# Patient Record
Sex: FEMALE | Race: WHITE | Employment: FULL TIME | URBAN - METROPOLITAN AREA
[De-identification: names, ages, dates, MRNs, and addresses within clinical notes are randomized per-mention and may not be internally consistent; named-entity substitution may affect disease eponyms.]

---

## 2020-09-14 ENCOUNTER — HOSPITAL ENCOUNTER (OUTPATIENT)
Dept: PHYSICAL THERAPY | Age: 50
Discharge: HOME | End: 2020-09-14
Payer: OTHER GOVERNMENT

## 2020-09-14 PROCEDURE — 97110 THERAPEUTIC EXERCISES: CPT | Performed by: PHYSICAL THERAPIST

## 2020-09-14 PROCEDURE — 97014 ELECTRIC STIMULATION THERAPY: CPT | Performed by: PHYSICAL THERAPIST

## 2020-09-14 PROCEDURE — 97162 PT EVAL MOD COMPLEX 30 MIN: CPT | Performed by: PHYSICAL THERAPIST

## 2020-09-14 NOTE — PROGRESS NOTES
PHYSICAL THERAPY - DAILY TREATMENT NOTE    Patient Name: Chelita Saucedo        Date: 2020  : 1970   YES Patient  Verified  Visit #:     Insurance: Payor: RICHARD / Plan: Ravinder Aggarwal 74 / Product Type:  /      In time: 12:00 Out time: 1:10   Total Treatment Time: 65     BCBS/Medicare Time Tracking (below)   Total Timed Codes (min):  na 1:1 Treatment Time:  na     TREATMENT AREA =  Low back pain [M54.5]    SUBJECTIVE  Pain Level (on 0 to 10 scale):  8-9  / 10   Medication Changes/New allergies or changes in medical history, any new surgeries or procedures?     NO    If yes, update Summary List   Subjective Functional Status/Changes:  [x]  No changes reported     See eval/POC           Modalities Rationale:     decrease pain and increase tissue extensibility to improve patient's ability to increase positional/movement tolerance  15 min [x] Estim, type/location: IFC to low back - prone after session                                     []  att     []  unatt     []  w/US     []  w/ice    [x]  w/heat    min []  Mechanical Traction: type/lbs                   []  pro   []  sup   []  int   []  cont    []  before manual    []  after manual    min []  Ultrasound, settings/location:      min []  Iontophoresis w/ dexamethasone, location:                                               []  take home patch       []  in clinic    min []  Ice     []  Heat    location/position:     min []  Vasopneumatic Device, press/temp:     min []  Other:    [x] Skin assessment post-treatment (if applicable):    [x]  intact    []  redness- no adverse reaction     []redness  adverse reaction:        15 min Therapeutic Exercise:  [x]  See flow sheet   Rationale:      increase ROM and improve coordination to improve the patients ability to maintain neutral spine posture       Billed With/As:   [] TE   [] TA   [] Neuro   [] Self Care Patient Education: [x] Review HEP    [] Progressed/Changed HEP based on:   [] positioning   [] body mechanics   [] transfers   [] heat/ice application    [] other:      Other Objective/Functional Measures:    FOTO - 32     Post Treatment Pain Level (on 0 to 10) scale:   7  / 10     ASSESSMENT  Assessment/Changes in Function:     Pt has signs and symptoms suggestive of acute L lumbar radiculopathy     []  See Progress Note/Recertification   Patient will continue to benefit from skilled PT services to modify and progress therapeutic interventions, address functional mobility deficits, address ROM deficits, address strength deficits, analyze and address soft tissue restrictions, analyze and cue movement patterns, analyze and modify body mechanics/ergonomics, and assess and modify postural abnormalities to attain remaining goals.    Progress toward goals / Updated goals:    Goals established today     PLAN  [x]  Upgrade activities as tolerated YES Continue plan of care   []  Discharge due to :    []  Other:      Therapist: Patricia Martinez PT    Date: 9/14/2020 Time: 8:42 AM     Future Appointments   Date Time Provider Rose De Leon   9/14/2020 12:00 PM Analia Batista, PT [facility]

## 2020-09-14 NOTE — PROGRESS NOTES
[facility] PHYSICAL THERAPY AT [address] [place] [address]  [address]oni [address]omes [address], [address] ,[address], [address]  Phone: [phone]  Fax: [phone] / [address] [address] PHYSICAL THERAPY SERVICES  Patient Name: Mirtha Patel : 1970   Medical   Diagnosis: Low back pain [M54.5] Treatment Diagnosis: LBP   Onset Date: 2020     Referral Source: Damien Cornejo MD [place] [facility]): 2020   Prior Hospitalization: See medical history Provider #: [id number]   Prior Level of Function: Unlimited walking/positional tolerance   Comorbidities: H/o L5/S1 HNP, recurrent LBP   Medications: Verified on Patient Summary List   The Plan of Care and following information is based on the information from the initial evaluation.   ===========================================================================================  Assessment / key information:  46 y/o female presents to PT with c/o L sided lower back and L posterior thigh and leg pain. She reports having a several year h/o of recurrent LBP, but has bene doing well for several years. She did a lot of yardwork this summer, and developed more LBP and it started radiating down her leg in August.  Pt describes inability to put pressure on her L LE, but after a course of steroids, she has improved slightly. Currently her pain is 8-9/10. Examination reveals pt sitting with a slight lean to the right. She has increased pain with transitional movements. Able to have pt prone, but it took awhile to get up onto elbows. She did get some centralization of symptoms form foot to just back and buttock area, as well improved spinal extension ROM. She was educated on sitting with lumbar roll to maintain neutral spine.   Pt has been relying on medications to help manage symptoms and allow her perform ADLs, and we tried to steer her focus to neutral spine posture or more extension to prevent exacerbation/peropherization of symptoms. Ms Mikki Ham has signs and symptoms suggestive of L lumbar radiculopathy with known HNP.  ===========================================================================================  Eval Complexity: History MEDIUM  Complexity : 1-2 comorbidities / personal factors will impact the outcome/ POC ;  Examination  MEDIUM Complexity : 3 Standardized tests and measures addressing body structure, function, activity limitation and / or participation in recreation ; Presentation MEDIUM Complexity : Evolving with changing characteristics ; Decision Making MEDIUM Complexity : FOTO score of 26-74; Overall Complexity MEDIUM  Problem List: pain affecting function, decrease ROM, decrease strength, impaired gait/ balance, decrease ADL/ functional abilitiies, decrease activity tolerance, decrease flexibility/ joint mobility and decrease transfer abilities   Treatment Plan may include any combination of the following: Therapeutic exercise, Therapeutic activities, Neuromuscular re-education, Physical agent/modality, Gait/balance training, Manual therapy, Dry Needling, Aquatic therapy, Patient education, Self Care training, Functional mobility training, Home safety training and Stair training  Patient / Family readiness to learn indicated by: asking questions, trying to perform skills and interest  Persons(s) to be included in education: patient (P)  Barriers to Learning/Limitations: None  Measures taken:    Patient Goal (s): \"alleviate lower back pain\"   Patient self reported health status: good  Rehabilitation Potential: good   Short Term Goals: To be accomplished in  2  weeks:  1. Pt independent with basic HEP for extension biased ROM and positioning. 2. Pt to manage symptoms to </= 4/10 with HEP, proper sitting posture/body mechanics, and use of lumbar corset.  Long Term Goals: To be accomplished in  6  weeks:  1.  Pt to increase FOTO score from 32 to >/= 51.  2. Pt independent with high level HEP for hip flexibility, spinal stabilization and body mechanics. 3. Pt able to perform dressing, shopping, and home housework without pain > 1/10.  4. Pt to go one week withoutt any L LE pain. Frequency / Duration:   Patient to be seen  2-3  times per week for 6  weeks:  Patient / Caregiver education and instruction: self care, activity modification, brace/ splint application and exercises    Therapist Signature: Jeevanlorraine Ron PT Date: 6/35/9641   Certification Period: NA Time: 8:41 AM   ===========================================================================================  I certify that the above Physical Therapy Services are being furnished while the patient is under my care. I agree with the treatment plan and certify that this therapy is necessary. Physician Signature:        Date:       Time:     Please sign and return to [facility] or you may fax the signed copy to [phone]. Thank you.

## 2020-09-16 ENCOUNTER — HOSPITAL ENCOUNTER (OUTPATIENT)
Dept: PHYSICAL THERAPY | Age: 50
Discharge: HOME | End: 2020-09-16
Payer: OTHER GOVERNMENT

## 2020-09-16 PROCEDURE — 97014 ELECTRIC STIMULATION THERAPY: CPT | Performed by: PHYSICAL THERAPIST

## 2020-09-16 PROCEDURE — 97110 THERAPEUTIC EXERCISES: CPT | Performed by: PHYSICAL THERAPIST

## 2020-09-16 NOTE — PROGRESS NOTES
PHYSICAL THERAPY - DAILY TREATMENT NOTE    Patient Name: Cherylene Band        Date: 2020  : 1970   YES Patient  Verified  Visit #:      12  Insurance: Payor: RICHARD / Plan: Ravinder Aggarwal 74 / Product Type:  /      In time: 12:00 Out time: 12:50   Total Treatment Time: 45     BCBS/Medicare Time Tracking (below)   Total Timed Codes (min):  na 1:1 Treatment Time:  na     TREATMENT AREA =  Low back pain [M54.5]    SUBJECTIVE  Pain Level (on 0 to 10 scale):  8-9  / 10   Medication Changes/New allergies or changes in medical history, any new surgeries or procedures? NO    If yes, update Summary List   Subjective Functional Status/Changes:  [x]  No changes reported     Pt reports trying to perform spinal extension exercises, but had to stop due to increase in back and L LE pain.           Modalities Rationale:     decrease pain and increase tissue extensibility to improve patient's ability to increase positional/movement tolerance  15 min [x] Estim, type/location: IFC to low back - L sidelying after session                                     []  att     []  unatt     []  w/US     []  w/ice    [x]  w/heat    min []  Mechanical Traction: type/lbs                   []  pro   []  sup   []  int   []  cont    []  before manual    []  after manual    min []  Ultrasound, settings/location:      min []  Iontophoresis w/ dexamethasone, location:                                               []  take home patch       []  in clinic    min []  Ice     []  Heat    location/position:     min []  Vasopneumatic Device, press/temp:     min []  Other:    [x] Skin assessment post-treatment (if applicable):    [x]  intact    []  redness- no adverse reaction     []redness  adverse reaction:        30 min Therapeutic Exercise:  [x]  See flow sheet   Rationale:      increase ROM and improve coordination to improve the patients ability to maintain neutral spine posture       Billed With/As:   [] TE   [] TA   [] Neuro   [] Self Care Patient Education: [x] Review HEP    [] Progressed/Changed HEP based on:   [] positioning   [] body mechanics   [] transfers   [] heat/ice application    [] other:      Other Objective/Functional Measures:    Pt loida to reduce leg pain with hooklying. Performed SKC, alternating to resolve all LE symptoms. In L sidelying, using positional distraction, which abolished L buttock and leg pain. Post Treatment Pain Level (on 0 to 10) scale:   5  / 10     ASSESSMENT  Assessment/Changes in Function:     Improved management of L LE symptoms with flexion/sidebend R movements/positions. []  See Progress Note/Recertification   Patient will continue to benefit from skilled PT services to modify and progress therapeutic interventions, address functional mobility deficits, address ROM deficits, address strength deficits, analyze and address soft tissue restrictions, analyze and cue movement patterns, analyze and modify body mechanics/ergonomics, and assess and modify postural abnormalities to attain remaining goals. Progress toward goals / Updated goals:    Making gradual progress with symptom management.      PLAN  [x]  Upgrade activities as tolerated YES Continue plan of care   []  Discharge due to :    []  Other:      Therapist: Roberth Boyce PT    Date: 9/16/2020 Time: 8:42 AM     Future Appointments   Date Time Provider Rose De Leon   9/16/2020 12:00 PM Renan Gates PT [facility]   9/18/2020  9:00 AM Joellen Kelsey, LUCIEN [facility]   9/21/2020 12:00 PM Pilo Solis [facility]   9/23/2020 12:45 PM Renan Gates PT [facility]   9/25/2020 12:15 PM Renan Gates PT [facility]   9/28/2020  3:30 PM Margot Campbell, PT [facility]

## 2020-09-18 ENCOUNTER — HOSPITAL ENCOUNTER (OUTPATIENT)
Dept: PHYSICAL THERAPY | Age: 50
Discharge: HOME | End: 2020-09-18
Payer: OTHER GOVERNMENT

## 2020-09-18 PROCEDURE — 97110 THERAPEUTIC EXERCISES: CPT

## 2020-09-18 PROCEDURE — 97014 ELECTRIC STIMULATION THERAPY: CPT

## 2020-09-18 NOTE — PROGRESS NOTES
PHYSICAL THERAPY - DAILY TREATMENT NOTE    Patient Name: Gisella Calhoun        Date: 2020  : 1970   YES Patient  Verified  Visit #:   3   of   12  Insurance: Payor: RICHARD / Plan: Ravinder Aggarwal 74 / Product Type:  /      In time: 9 A Out time: 9:55 A   Total Treatment Time: 50     BCBS/Medicare Time Tracking (below)   Total Timed Codes (min):  50 1:1 Treatment Time:  50     TREATMENT AREA = Low back pain [M54.5]    SUBJECTIVE  Pain Level (on 0 to 10 scale):  -9  / 10   Medication Changes/New allergies or changes in medical history, any new surgeries or procedures? NO    If yes, update Summary List   Subjective Functional Status/Changes:  []  No changes reported     Patient reports her pain is constant in back & L leg, 7/10 at the best, she is still waking up every 2-3 hours.            Modalities Rationale:     decrease pain to improve patient's ability to return to pain-free standing  15 min [x] Estim, type/location: IFC to l/s in R S/L with 2 pillows                                     []  att     [x]  unatt     []  w/US     []  w/ice    [x]  w/heat    min []  Mechanical Traction: type/lbs                   []  pro   []  sup   []  int   []  cont    []  before manual    []  after manual    min []  Ultrasound, settings/location:      min []  Iontophoresis w/ dexamethasone, location:                                               []  take home patch       []  in clinic    min []  Ice     []  Heat    location/position:     min []  Vasopneumatic Device, press/temp:     min []  Other:    [] Skin assessment post-treatment (if applicable):    [x]  intact    [x]  redness- no adverse reaction     []redness  adverse reaction:        35 min Therapeutic Exercise:  [x]  See flow sheet   Rationale:      increase ROM and increase strength to improve the patients ability to return to pain-free ADLs     Billed With/As:   [] TE   [] TA   [] Neuro   [] Self Care Patient Education: [x] Review HEP [] Progressed/Changed HEP based on:   [] positioning   [] body mechanics   [] transfers   [] heat/ice application    [] other:      Other Objective/Functional Measures: Added double knee to chest & H/L abd draw      Post Treatment Pain Level (on 0 to 10) scale:   7  / 10     ASSESSMENT  Assessment/Changes in Function:     Slow progress with symptom reduction      []  See Progress Note/Recertification   Patient will continue to benefit from skilled PT services to modify and progress therapeutic interventions, address functional mobility deficits, address ROM deficits, address strength deficits, analyze and modify body mechanics/ergonomics, assess and modify postural abnormalities and instruct in home and community integration to attain remaining goals.    Progress toward goals / Updated goals:    Progressing towards LTG 1 & 2     PLAN  []  Upgrade activities as tolerated YES Continue plan of care   []  Discharge due to :    []  Other:      Therapist: Neo Ploanco PT    Date: 9/18/2020 Time: 9:11 AM     Future Appointments   Date Time Provider Rose De Leon   9/21/2020 12:00 PM Amanda Durán [facility]   9/23/2020 12:45 PM Landon Moody PT [facility]   9/25/2020 12:15 PM Landon Moody PT [facility]   9/28/2020  3:30 PM Fco Campbell, PT [facility]

## 2020-09-21 ENCOUNTER — HOSPITAL ENCOUNTER (OUTPATIENT)
Dept: PHYSICAL THERAPY | Age: 50
Discharge: HOME | End: 2020-09-21
Payer: OTHER GOVERNMENT

## 2020-09-21 PROCEDURE — 97110 THERAPEUTIC EXERCISES: CPT | Performed by: PHYSICAL THERAPIST

## 2020-09-21 PROCEDURE — 97014 ELECTRIC STIMULATION THERAPY: CPT | Performed by: PHYSICAL THERAPIST

## 2020-09-21 NOTE — PROGRESS NOTES
PHYSICAL THERAPY - DAILY TREATMENT NOTE    Patient Name: Chelita Saucedo        Date: 2020  : 1970   YES Patient  Verified  Visit #:      12  Insurance: Payor:  / Plan: Erdaria Labrum / Product Type:  /      In time: 11:55 Out time: 12:50   Total Treatment Time: 55     BCBS/Medicare Time Tracking (below)   Total Timed Codes (min):  na 1:1 Treatment Time:  na     TREATMENT AREA =  Low back pain [M54.5]    SUBJECTIVE  Pain Level (on 0 to 10 scale):  -9  / 10   Medication Changes/New allergies or changes in medical history, any new surgeries or procedures? NO    If yes, update Summary List   Subjective Functional Status/Changes:  [x]  No changes reported     Pt reports not having any prolonged relief of symptoms. She has persist pain, that worsens when pain meds wear off.         Modalities Rationale:     decrease pain and increase tissue extensibility to improve patient's ability to increase positional/movement tolerance  15 min [x] Estim, type/location: IFC to low back - L sidelying after session                                     []  att     []  unatt     []  w/US     []  w/ice    [x]  w/heat    min []  Mechanical Traction: type/lbs                   []  pro   []  sup   []  int   []  cont    []  before manual    []  after manual    min []  Ultrasound, settings/location:      min []  Iontophoresis w/ dexamethasone, location:                                               []  take home patch       []  in clinic    min []  Ice     []  Heat    location/position:     min []  Vasopneumatic Device, press/temp:     min []  Other:    [x] Skin assessment post-treatment (if applicable):    [x]  intact    []  redness- no adverse reaction     []redness  adverse reaction:        30 min Therapeutic Exercise:  [x]  See flow sheet   Rationale:      increase ROM and improve coordination to improve the patients ability to maintain neutral spine posture       Billed With/As:   [] TE   [] TA   [] Neuro   [] Self Care Patient Education: [x] Review HEP    [] Progressed/Changed HEP based on:   [] positioning   [] body mechanics   [] transfers   [] heat/ice application    [] other:      Other Objective/Functional Measures:    Pt had some symptoms when performing SKC exercise, but loosens with practice     Post Treatment Pain Level (on 0 to 10) scale:   5  / 10     ASSESSMENT  Assessment/Changes in Function:     Pt has not been able to change symptoms in the morning when first getting up. []  See Progress Note/Recertification   Patient will continue to benefit from skilled PT services to modify and progress therapeutic interventions, address functional mobility deficits, address ROM deficits, address strength deficits, analyze and address soft tissue restrictions, analyze and cue movement patterns, analyze and modify body mechanics/ergonomics, and assess and modify postural abnormalities to attain remaining goals. Progress toward goals / Updated goals: Only temporary relief of symptoms with therex.      PLAN  [x]  Upgrade activities as tolerated YES Continue plan of care   []  Discharge due to :    []  Other:      Therapist: Chris Dolan PT    Date: 9/21/2020 Time: 8:42 AM     Future Appointments   Date Time Provider Rose De Leon   9/21/2020 12:00 PM Debo Agarwal [facility]   9/23/2020 12:45 PM Liang Smith PT [facility]   9/25/2020 12:15 PM Liang Smith, PT [facility]   9/28/2020  3:30 PM Michael Campbell, PT [facility]

## 2020-09-23 ENCOUNTER — HOSPITAL ENCOUNTER (OUTPATIENT)
Dept: PHYSICAL THERAPY | Age: 50
Discharge: HOME | End: 2020-09-23
Payer: OTHER GOVERNMENT

## 2020-09-23 PROCEDURE — 97110 THERAPEUTIC EXERCISES: CPT | Performed by: PHYSICAL THERAPIST

## 2020-09-23 PROCEDURE — 97014 ELECTRIC STIMULATION THERAPY: CPT | Performed by: PHYSICAL THERAPIST

## 2020-09-23 NOTE — PROGRESS NOTES
PHYSICAL THERAPY - DAILY TREATMENT NOTE    Patient Name: Sarah Quach        Date: 2020  : 1970   YES Patient  Verified  Visit #:     of   12  Insurance: Payor: RICHARD / Plan: Steve Olea / Product Type:  /      In time: 12:45 Out time: 1:30   Total Treatment Time: 35     BCBS/Medicare Time Tracking (below)   Total Timed Codes (min):  na 1:1 Treatment Time:  na     TREATMENT AREA =  Low back pain [M54.5]    SUBJECTIVE  Pain Level (on 0 to 10 scale):  6  / 10   Medication Changes/New allergies or changes in medical history, any new surgeries or procedures? NO    If yes, update Summary List   Subjective Functional Status/Changes:  [x]  No changes reported     Pt reports doing her pre-op visit at [facility] yesterday. She notes not having any medications since yesterday and her pain is a 6/10.         Modalities Rationale:     decrease pain and increase tissue extensibility to improve patient's ability to increase positional/movement tolerance  15 min [x] Estim, type/location: IFC to low back - L sidelying after session                                     []  att     []  unatt     []  w/US     []  w/ice    [x]  w/heat    min []  Mechanical Traction: type/lbs                   []  pro   []  sup   []  int   []  cont    []  before manual    []  after manual    min []  Ultrasound, settings/location:      min []  Iontophoresis w/ dexamethasone, location:                                               []  take home patch       []  in clinic    min []  Ice     []  Heat    location/position:     min []  Vasopneumatic Device, press/temp:     min []  Other:    [x] Skin assessment post-treatment (if applicable):    [x]  intact    []  redness- no adverse reaction     []redness  adverse reaction:        20 min Therapeutic Exercise:  [x]  See flow sheet   Rationale:      increase ROM and improve coordination to improve the patients ability to maintain neutral spine posture       Billed With/As:   [] TE   [] TA   [] Neuro   [] Self Care Patient Education: [x] Review HEP    [] Progressed/Changed HEP based on:   [] positioning   [] body mechanics   [] transfers   [] heat/ice application    [] other:      Other Objective/Functional Measures:    No changes to therex. Post Treatment Pain Level (on 0 to 10) scale:   6  / 10     ASSESSMENT  Assessment/Changes in Function:     Pt managing pain better, but not based upon any specific therapy. She is planning to proceed with surgery next Tuesday. []  See Progress Note/Recertification   Patient will continue to benefit from skilled PT services to modify and progress therapeutic interventions, address functional mobility deficits, address ROM deficits, address strength deficits, analyze and address soft tissue restrictions, analyze and cue movement patterns, analyze and modify body mechanics/ergonomics, and assess and modify postural abnormalities to attain remaining goals. Progress toward goals / Updated goals:    Managing symptoms better without medications.      PLAN  [x]  Upgrade activities as tolerated YES Continue plan of care   []  Discharge due to :    []  Other:      Therapist: Chris Dolan PT    Date: 9/23/2020 Time: 8:42 AM     Future Appointments   Date Time Provider Rose De Leon   9/23/2020 12:45 PM Debo [facility] [place] [facility]   9/25/2020 12:15 PM Liang Smith, PT [facility]   9/28/2020  3:30 PM Michael Campbell, PT [facility]

## 2020-09-25 ENCOUNTER — APPOINTMENT (OUTPATIENT)
Dept: PHYSICAL THERAPY | Age: 50
End: 2020-09-25
Payer: OTHER GOVERNMENT

## 2020-09-28 ENCOUNTER — APPOINTMENT (OUTPATIENT)
Dept: PHYSICAL THERAPY | Age: 50
End: 2020-09-28
Payer: OTHER GOVERNMENT

## 2020-10-05 NOTE — PROGRESS NOTES
[facility] PHYSICAL THERAPY AT [address] [place] [address]  Toni Rodrigu[address], [address] [place] [address], [address]  Phone: [phone]  Fax: [phone] SUMMARY  Patient Name: Humza Vargas : 1970   Treatment/Medical Diagnosis: Low back pain [M54.5]   Referral Source: Lydia Florez MD     Date of Initial Visit: 20 Attended Visits: 5 Missed Visits: 0     SUMMARY OF TREATMENT  Hip flexibility, lumbar ROM/stabilization exercises, education in neutral spine postures, and modalities for symptom management. CURRENT STATUS  Pt was last seen on 20, and reported pain of 6/10. She continued to have pain into back and L buttock area, and is reliant on medicaitons for relief. We tried several approaches to minimizing/centralizing symptoms, but pt only received relief from positional distraction in R sidelying. Pt called to cancel her remaining 2 sessions, as she is planning to have surgery on 20    RECOMMENDATIONS  Discontinue therapy due to lack of appreciable progress towards goals. If you have any questions/comments please contact us directly at [phone]. Thank you for allowing us to assist in the care of your patient. Therapist Signature:  Kesha Hudson PT Date: 10/5/20     Time: 1:28 PM

## 2020-12-10 ENCOUNTER — HOSPITAL ENCOUNTER (OUTPATIENT)
Dept: PHYSICAL THERAPY | Age: 50
Discharge: HOME | End: 2020-12-10
Payer: OTHER GOVERNMENT

## 2020-12-10 PROCEDURE — 97162 PT EVAL MOD COMPLEX 30 MIN: CPT

## 2020-12-10 PROCEDURE — 97110 THERAPEUTIC EXERCISES: CPT

## 2020-12-10 NOTE — PROGRESS NOTES
PHYSICAL THERAPY - DAILY TREATMENT NOTE    Patient Name: Brenna Varela        Date: 12/10/2020  : 1970   YES Patient  Verified  Visit #:     Insurance: Payor: RICHARD / Plan: Ravinder Aggarwal 74 / Product Type:  /      In time: 11:20 A Out time: 12:05 P   Total Treatment Time: 45     BCBS/Medicare Time Tracking (below)   Total Timed Codes (min):  NA 1:1 Treatment Time:  NA     TREATMENT AREA = Low back pain [M54.5]    SUBJECTIVE  Pain Level (on 0 to 10 scale):  2  / 10   Medication Changes/New allergies or changes in medical history, any new surgeries or procedures?     NO    If yes, update Summary List   Subjective Functional Status/Changes:  []  No changes reported     See POC            Modalities Rationale:    Patient deferred   min [] Estim, type/location:                                      []  att     []  unatt     []  w/US     []  w/ice    []  w/heat    min []  Mechanical Traction: type/lbs                   []  pro   []  sup   []  int   []  cont    []  before manual    []  after manual    min []  Ultrasound, settings/location:      min []  Iontophoresis w/ dexamethasone, location:                                               []  take home patch       []  in clinic    min []  Ice     []  Heat    location/position:     min []  Vasopneumatic Device, press/temp:     min []  Other:    [] Skin assessment post-treatment (if applicable):    []  intact    []  redness- no adverse reaction     []redness  adverse reaction:        15 min Therapeutic Exercise:  [x]  See flow sheet   Rationale:      increase ROM and increase strength to improve the patients ability to return to pain-free bed mobility      Billed With/As:   [] TE   [] TA   [] Neuro   [] Self Care Patient Education: [x] Review HEP    [] Progressed/Changed HEP based on:   [] positioning   [] body mechanics   [] transfers   [] heat/ice application    [] other:      Other Objective/Functional Measures:    See POC     Post Treatment Pain Level (on 0 to 10) scale:   2 / 10     ASSESSMENT  Assessment/Changes in Function:     See POC      []  See Progress Note/Recertification   Patient will continue to benefit from skilled PT services to modify and progress therapeutic interventions, address functional mobility deficits, address ROM deficits, address strength deficits, assess and modify postural abnormalities, address imbalance/dizziness and instruct in home and community integration to attain remaining goals.    Progress toward goals / Updated goals:    Progressing towards goals established at [address] [facility] [id number]  []  Upgrade activities as tolerated YES Continue plan of care   []  Discharge due to :    []  Other:      Therapist: Ruthann Mayes, LUCIEN    Date: 12/10/2020 Time: 6:37 PM     Future Appointments   Date Time Provider Rose De Leon   12/14/2020 12:30 PM Kevin Fee, PT [facility] [address]   12/17/2020 12:00 PM Kevin Fee, PT [facility] [address]   12/21/2020 11:45 AM Kevin Fee, PT [facility] [address]   12/22/2020  9:30 AM Kevin Fee, PT [facility] [address]   12/28/2020  9:00 AM Lauren Bi, PT [facility] [address]   12/30/2020  9:45 AM Lauren Bi, PT [facility] [address]   1/4/2021  8:00 AM Amanda Welch, PT [facility] [address]

## 2020-12-10 NOTE — PROGRESS NOTES
[facility] PHYSICAL THERAPY AT [address] [place] [address]  [address], [address] ,[address], [address]  Phone: [phone]  Fax: [phone] / [address]  Patient Name: Brenna Varela : 1970   Medical   Diagnosis: Low back pain [M54.5] Treatment Diagnosis: LBP   Onset Date: 20     Referral Source: Ivone Paz MD [facility]): 12/10/2020   Prior Hospitalization: See medical history Provider #: [id number]   Prior Level of Function: Limited with ADLs, L LE pain to lower leg    Comorbidities: H/o LBP, L glut pain   Medications: Verified on Patient Summary List   The Plan of Care and following information is based on the information from the initial evaluation.   ==================================================================================  Assessment / key information:  Patient is a pleasant 45 y.o. female who presents to [facility] s/p L5-S1 laminectomy on 20. She was previously seen for PT & was last seen on 20 & continued to report c/o L buttock pain as well as LBP that was constant in nature. Current c/o pain are intermittent in nature & worsen with activities such as prolonged sitting, at times with bed mobility & sit to stand transfers. Sx improve with motrin & .  Average reported pain level at 2/10, 2-3/10 at worst & 0-1/10 at best.  Upon objective evaluation, patient demonstrates MMT as follows: overall B LE strength 4+/5. Dural tension signs: Slump & prone knee flexion tests were mildly (+) on L for reproduction of buttock pain. Good reduction of pain with use of l/s roll in sitting. Mild c/o pain with rolling & supine to sit transfers today. Patient can benefit PT interventions to improve posture, decrease pain & centralize L LE sx to facilitate return to unlimited ADLs, work activities & overall functional status. ==================================================================================  Eval Complexity: History MEDIUM  Complexity : 1-2 comorbidities / personal factors will impact the outcome/ POC ;  Examination  MEDIUM Complexity : 3 Standardized tests and measures addressing body structure, function, activity limitation and / or participation in recreation ; Presentation MEDIUM Complexity : Evolving with changing characteristics ; Decision Making MEDIUM Complexity : FOTO score of 26-74; Overall Complexity MEDIUM  Problem List: pain affecting function, decrease ROM, decrease strength, impaired gait/ balance, decrease ADL/ functional abilitiies, decrease activity tolerance, decrease flexibility/ joint mobility, decrease transfer abilities and other FOTO 43 points   Treatment Plan may include any combination of the following: Therapeutic exercise, Therapeutic activities, Neuromuscular re-education, Physical agent/modality, Gait/balance training, Manual therapy, Aquatic therapy, Patient education, Self Care training, Functional mobility training, Home safety training and Stair training  Patient / Family readiness to learn indicated by: asking questions, trying to perform skills and interest  Persons(s) to be included in education: patient (P)  Barriers to Learning/Limitations: None  Measures taken:    Patient Goal (s): \"relief from pain to return to all activities\"   Patient self reported health status: good  Rehabilitation Potential: good   Short Term Goals: To be accomplished in  2  weeks:  1) Establish HEP to prevent further disability. 2) Patient will report decreased c/o pain to < or = 1-2/10 to facilitate return to PLOF with manageable sx in back. 3) Improve FOTO score from 33 points to > or = 43 points indicating improved tolerance with ADLs in regards to back. 4) Patient to perform all bed mobility & sit to stand transfers with no increase in LBP or L LE pain.  Long Term Goals:  To be accomplished in 4  weeks:  1) Improve FOTO score from 43 points to > or = 53 points indicating improved tolerance with ADLs in regards to back. 2) Patient to report 75% improvement in overall function in preparation for return to recreational activities with manageable sx in back. 3) Patient will demonstrate (-) neural tension with Slump & prone knee flexion on L to facilitate driving with manageable sx. 4) Patient to be independent & compliant with HEP in preparation for D/C. Frequency / Duration:   Patient to be seen  2-3  times per week for 4  weeks:  Patient / Caregiver education and instruction: self care, activity modification, brace/ splint application and exercises    Therapist Signature: JOE Gabriel cert MDT Date: 81/91/8052   Certification Period: None Time: 12:13 PM   ==================================================================================  I certify that the above Physical Therapy Services are being furnished while the patient is under my care. I agree with the treatment plan and certify that this therapy is necessary. Physician Signature:        Date:       Time:     Please sign and return to [facility] [place] or you may fax the signed copy to [phone]. Thank you.

## 2020-12-14 ENCOUNTER — HOSPITAL ENCOUNTER (OUTPATIENT)
Dept: PHYSICAL THERAPY | Age: 50
Discharge: HOME | End: 2020-12-14
Payer: OTHER GOVERNMENT

## 2020-12-14 PROCEDURE — 97110 THERAPEUTIC EXERCISES: CPT

## 2020-12-14 NOTE — PROGRESS NOTES
PHYSICAL THERAPY - DAILY TREATMENT NOTE    Patient Name: Sophie Zafar        Date: 2020  : 1970   YES Patient  Verified  Visit #:      of   12  Insurance: Payor: RICHARD / Plan: Ravinder Aggarwal 74 / Product Type:  /      In time: 10:30 A Out time: 11:40 A   Total Treatment Time: 60     BCBS/Medicare Time Tracking (below)   Total Timed Codes (min):  NA 1:1 Treatment Time:  NA     TREATMENT AREA = Low back pain [M54.5]    SUBJECTIVE  Pain Level (on 0 to 10 scale):  2-3  / 10   Medication Changes/New allergies or changes in medical history, any new surgeries or procedures? NO    If yes, update Summary List   Subjective Functional Status/Changes:  []  No changes reported     Patient reports she still feels some pain in her leg when she takes a long stride with her L leg.             Modalities Rationale:     decrease pain and increase tissue extensibility to improve patient's ability to return to pain-free ambulation    min [] Estim, type/location:                                      []  att     []  unatt     []  w/US     []  w/ice    []  w/heat    min []  Mechanical Traction: type/lbs                   []  pro   []  sup   []  int   []  cont    []  before manual    []  after manual    min []  Ultrasound, settings/location:      min []  Iontophoresis w/ dexamethasone, location:                                               []  take home patch       []  in clinic   10 min []  Ice     [x]  Heat    location/position: Supine to l/s     min []  Vasopneumatic Device, press/temp:     min []  Other:    [] Skin assessment post-treatment (if applicable):    [x]  intact    [x]  redness- no adverse reaction     []redness  adverse reaction:        50 min Therapeutic Exercise:  [x]  See flow sheet   Rationale:      increase ROM and increase strength to improve the patients ability to return to pain-free standing     Billed With/As:   [] TE   [] TA   [] Neuro   [] Self Care Patient Education: [x] Review HEP    [] Progressed/Changed HEP based on:   [] positioning   [] body mechanics   [] transfers   [] heat/ice application    [] other:      Other Objective/Functional Measures:    Initiated therapeutic exercise per flow sheet     Post Treatment Pain Level (on 0 to 10) scale:   1-2  / 10     ASSESSMENT  Assessment/Changes in Function:     Good tolerance to initial program, no increase in pain      []  See Progress Note/Recertification   Patient will continue to benefit from skilled PT services to modify and progress therapeutic interventions, address functional mobility deficits, address ROM deficits, address strength deficits, analyze and modify body mechanics/ergonomics, assess and modify postural abnormalities and instruct in home and community integration to attain remaining goals.    Progress toward goals / Updated goals:    Progressing towards  STG 2     PLAN  []  Upgrade activities as tolerated YES Continue plan of care   []  Discharge due to :    []  Other:      Therapist: Shayan Hare PT    Date: 12/14/2020 Time: 1:14 PM     Future Appointments   Date Time Provider Rose De Leon   12/17/2020 12:00 PM Eduardo Palacios, PT [facility]   12/21/2020 11:45 AM Eduardo Palacios, PT [facility]   12/22/2020  9:30 AM Eduardo Palacios, PT [facility]   12/28/2020  9:00 AM Ofelia Neal, PT [facility]   12/30/2020  9:45 AM Ofelia Neal, PT [facility]   1/4/2021  8:00 AM Lisa Welch, PT [facility]

## 2020-12-17 ENCOUNTER — HOSPITAL ENCOUNTER (OUTPATIENT)
Dept: PHYSICAL THERAPY | Age: 50
Discharge: HOME | End: 2020-12-17
Payer: OTHER GOVERNMENT

## 2020-12-17 PROCEDURE — 97110 THERAPEUTIC EXERCISES: CPT

## 2020-12-17 NOTE — PROGRESS NOTES
PHYSICAL THERAPY - DAILY TREATMENT NOTE    Patient Name: Meenakshi Long        Date: 2020  : 1970   YES Patient  Verified  Visit #:   3   of   12  Insurance: Payor:  / Plan: Ravinder Aggarwal 74 / Product Type:  /      In time: 12:05 P Out time: 1:05 P   Total Treatment Time: 55     BCBS/Medicare Time Tracking (below)   Total Timed Codes (min):  NA 1:1 Treatment Time:  NA     TREATMENT AREA = Low back pain [M54.5]    SUBJECTIVE  Pain Level (on 0 to 10 scale):  2-3  / 10   Medication Changes/New allergies or changes in medical history, any new surgeries or procedures?     NO    If yes, update Summary List   Subjective Functional Status/Changes:  []  No changes reported     Patient reports that most of her pain is when she over does it when she walks, she tries to stop & stretch            Modalities Rationale:    Patient deferred     min [] Estim, type/location:                                      []  att     []  unatt     []  w/US     []  w/ice    []  w/heat    min []  Mechanical Traction: type/lbs                   []  pro   []  sup   []  int   []  cont    []  before manual    []  after manual    min []  Ultrasound, settings/location:      min []  Iontophoresis w/ dexamethasone, location:                                               []  take home patch       []  in clinic    min []  Ice     []  Heat    location/position:     min []  Vasopneumatic Device, press/temp:     min []  Other:    [] Skin assessment post-treatment (if applicable):    []  intact    []  redness- no adverse reaction     []redness  adverse reaction:        55/  40 min Therapeutic Exercise:  [x]  See flow sheet   Rationale:      increase ROM and increase strength to improve the patients ability to return to pain-free standing     Billed With/As:   [] TE   [] TA   [] Neuro   [] Self Care Patient Education: [x] Review HEP    [] Progressed/Changed HEP based on:   [] positioning   [] body mechanics   [] transfers [] heat/ice application    [] other:      Other Objective/Functional Measures: Added prone multifidus over 1 pillow, seated swiss ball abd draw & march    Post Treatment Pain Level (on 0 to 10) scale:   1-2  / 10     ASSESSMENT  Assessment/Changes in Function:     Good tolerance to today's progressions, v/c to avoid excessive l/s lordosis in sitting     []  See Progress Note/Recertification   Patient will continue to benefit from skilled PT services to modify and progress therapeutic interventions, address functional mobility deficits, address ROM deficits, address strength deficits, analyze and address soft tissue restrictions, analyze and cue movement patterns, analyze and modify body mechanics/ergonomics, assess and modify postural abnormalities, address imbalance/dizziness and instruct in home and community integration to attain remaining goals.    Progress toward goals / Updated goals:    Progressing towards STG 2      PLAN  []  Upgrade activities as tolerated YES Continue plan of care   []  Discharge due to :    []  Other:      Therapist: Sofi Gilbert PT    Date: 12/17/2020 Time: 12:38 PM     Future Appointments   Date Time Provider Rose De Leon   12/21/2020 11:45 AM Tameka Olivas, PT [facility]   12/22/2020  9:30 AM Tameka Olivas PT [facility]   12/28/2020  9:00 AM Eliza Amaya, PT [facility]   12/30/2020  9:45 AM Eliza Amaya, PT [facility]   1/4/2021  8:00 AM Elsa Welch, PT [facility]

## 2020-12-21 ENCOUNTER — HOSPITAL ENCOUNTER (OUTPATIENT)
Dept: PHYSICAL THERAPY | Age: 50
Discharge: HOME | End: 2020-12-21
Payer: OTHER GOVERNMENT

## 2020-12-21 PROCEDURE — 97110 THERAPEUTIC EXERCISES: CPT

## 2020-12-21 NOTE — PROGRESS NOTES
PHYSICAL THERAPY - DAILY TREATMENT NOTE    Patient Name: Yolis Rubio        Date: 2020  : 1970   YES Patient  Verified  Visit #:     Insurance: Payor: RICHARD / Plan: Ravinder Aggarwal 74 / Product Type:  /      In time: 11:50 A Out time: 12:50 P   Total Treatment Time: 55     BCBS/Medicare Time Tracking (below)   Total Timed Codes (min):  NA 1:1 Treatment Time:  NA     TREATMENT AREA = Low back pain [M54.5]    SUBJECTIVE  Pain Level (on 0 to 10 scale):  2  / 10   Medication Changes/New allergies or changes in medical history, any new surgeries or procedures? NO    If yes, update Summary List   Subjective Functional Status/Changes:  []  No changes reported     Patient reports that she still has tightness in her R hip flexor & she has most of her stiffness first thing in the morning, she is more still if she lays on her side, she does better when she is on her back with her R leg extended.             Modalities Rationale:    Patient deferred   min [] Estim, type/location:                                      []  att     []  unatt     []  w/US     []  w/ice    []  w/heat    min []  Mechanical Traction: type/lbs                   []  pro   []  sup   []  int   []  cont    []  before manual    []  after manual    min []  Ultrasound, settings/location:      min []  Iontophoresis w/ dexamethasone, location:                                               []  take home patch       []  in clinic    min []  Ice     []  Heat    location/position:     min []  Vasopneumatic Device, press/temp:     min []  Other:    [] Skin assessment post-treatment (if applicable):    []  intact    []  redness- no adverse reaction     []redness  adverse reaction:        45 min Therapeutic Exercise:  [x]  See flow sheet   Rationale:      increase ROM, increase strength and improve balance to improve the patients ability to return to pain-free walking program     10 min Therapeutic Activity: [x]  See flow sheet   Rationale:    increase ROM, increase strength, improve balance and increase proprioception to improve the patients ability to return to pain-free lifting      Billed With/As:   [] TE   [] TA   [] Neuro   [] Self Care Patient Education: [x] Review HEP    [] Progressed/Changed HEP based on:   [] positioning   [] body mechanics   [] transfers   [] heat/ice application    [] other:      Other Objective/Functional Measures: Added quadraped l/s stabilization per flow sheet, palloff press in sitting with swiss ball    TA: seated hip hinge with dowel, sit to stand with dowel, hip hinge in standing, instruction in golfer's lift     Post Treatment Pain Level (on 0 to 10) scale:   1-2  / 10     ASSESSMENT  Assessment/Changes in Function:     Good tolerance to today's treatment, v/c for posture throughout, improved tolerance with weightbearing on R LE with golfer's lift      []  See Progress Note/Recertification   Patient will continue to benefit from skilled PT services to modify and progress therapeutic interventions, address functional mobility deficits, address ROM deficits, address strength deficits, analyze and modify body mechanics/ergonomics, assess and modify postural abnormalities, address imbalance/dizziness and instruct in home and community integration to attain remaining goals.    Progress toward goals / Updated goals:    Progressing towards STG 2, STG 4 met      PLAN  []  Upgrade activities as tolerated YES Continue plan of care   []  Discharge due to :    []  Other:      Therapist: Zacarias Flores PT    Date: 12/21/2020 Time: 12:45 PM     Future Appointments   Date Time Provider Rose De Leon   12/22/2020  9:30 AM Kolby Camacho, PT [facility]   12/28/2020  9:00 AM Lynsey Still, PT [facility]   12/30/2020  9:45 AM Lynsey Still PT [facility]   1/4/2021  8:00 AM Randell Welch, PT [facility]

## 2020-12-22 ENCOUNTER — HOSPITAL ENCOUNTER (OUTPATIENT)
Dept: PHYSICAL THERAPY | Age: 50
Discharge: HOME | End: 2020-12-22
Payer: OTHER GOVERNMENT

## 2020-12-22 PROCEDURE — 97110 THERAPEUTIC EXERCISES: CPT

## 2020-12-22 NOTE — PROGRESS NOTES
PHYSICAL THERAPY - DAILY TREATMENT NOTE    Patient Name: Carlos Robb        Date: 2020  : 1970   YES Patient  Verified  Visit #:      of   12  Insurance: Payor: RICHARD / Plan: Ravinder Aggarwal 74 / Product Type:  /      In time: 9:30 A Out time: 10:35 A   Total Treatment Time: 55/  45     BCBS/Medicare Time Tracking (below)   Total Timed Codes (min):  NA 1:1 Treatment Time:  NA     TREATMENT AREA = Low back pain [M54.5]    SUBJECTIVE  Pain Level (on 0 to 10 scale):  2  / 10   Medication Changes/New allergies or changes in medical history, any new surgeries or procedures? NO    If yes, update Summary List   Subjective Functional Status/Changes:  []  No changes reported     Patient reports no new complaints since PT yesterday.          Modalities Rationale:    Patient deferred   min [] Estim, type/location:                                      []  att     []  unatt     []  w/US     []  w/ice    []  w/heat    min []  Mechanical Traction: type/lbs                   []  pro   []  sup   []  int   []  cont    []  before manual    []  after manual    min []  Ultrasound, settings/location:      min []  Iontophoresis w/ dexamethasone, location:                                               []  take home patch       []  in clinic    min []  Ice     []  Heat    location/position:     min []  Vasopneumatic Device, press/temp:     min []  Other:    [] Skin assessment post-treatment (if applicable):    []  intact    []  redness- no adverse reaction     []redness  adverse reaction:        55/  45 min Therapeutic Exercise:  [x]  See flow sheet   Rationale:      increase ROM, increase strength and improve balance to improve the patients ability to return to pain-free walking program       Billed With/As:   [] TE   [] TA   [] Neuro   [] Self Care Patient Education: [x] Review HEP    [] Progressed/Changed HEP based on:   [] positioning   [] body mechanics   [] transfers   [] heat/ice application [] other:      Other Objective/Functional Measures:    Held tabletop l/s stabilization given c/o R hip flexor pain today    Emphasis on postural with lifting at home as well as caring for her puppy. Post Treatment Pain Level (on 0 to 10) scale:   1 / 10     ASSESSMENT  Assessment/Changes in Function:     Good tolerance to today's treatment, v/c for upright posture with seated swiss ball stabilization      []  See Progress Note/Recertification   Patient will continue to benefit from skilled PT services to modify and progress therapeutic interventions, address functional mobility deficits, address ROM deficits, address strength deficits, analyze and modify body mechanics/ergonomics, assess and modify postural abnormalities, address imbalance/dizziness and instruct in home and community integration to attain remaining goals.    Progress toward goals / Updated goals:    Progressing towards STG 2, STG 4 met      PLAN  []  Upgrade activities as tolerated YES Continue plan of care   []  Discharge due to :    []  Other:      Therapist: Charles Bashir PT    Date: 12/22/2020 Time: 10:35 AM     Future Appointments   Date Time Provider Rose De Leon   12/28/2020  9:00 AM Mirtha Pires, PT [facility] [place] [facility]   12/30/2020  9:45 AM Mirtha Pires, PT [facility]   1/4/2021  8:00 AM Zia Cottrell, PT [facility]   1/7/2021 10:15 AM Zia Kingdom, PT [facility]   1/11/2021 10:15 AM Zia Kingdom, PT [facility]   1/14/2021 11:15 AM Zia Kingdom, PT [facility]   1/19/2021 11:45 AM Zia Kingdom, PT [facility]   1/21/2021 11:45 AM [place] Kingdom, PT [facility]   1/26/2021 11:45 AM Zia Kingdom, PT [facility]   1/28/2021 11:15 AM Melina Welch, PT [facility]

## 2020-12-28 ENCOUNTER — HOSPITAL ENCOUNTER (OUTPATIENT)
Dept: PHYSICAL THERAPY | Age: 50
Discharge: HOME | End: 2020-12-28
Payer: OTHER GOVERNMENT

## 2020-12-28 PROCEDURE — 97110 THERAPEUTIC EXERCISES: CPT

## 2020-12-28 PROCEDURE — 97140 MANUAL THERAPY 1/> REGIONS: CPT

## 2020-12-28 NOTE — PROGRESS NOTES
PHYSICAL THERAPY - DAILY TREATMENT NOTE    Patient Name: Bonita Bowman        Date: 2020  : 1970   YES Patient  Verified  Visit #:   6   of   15  Insurance: Payor: RICHARD / Plan: Ravinder Aggarwal 74 / Product Type:  /      In time: 900 Out time: 955   Total Treatment Time: 50     BCBS/Medicare Time Tracking (below)   Total Timed Codes (min):   1:1 Treatment Time:       TREATMENT AREA =  Low back pain [M54.5]    SUBJECTIVE  Pain Level (on 0 to 10 scale):  2  / 10   Medication Changes/New allergies or changes in medical history, any new surgeries or procedures? NO    If yes, update Summary List   Subjective Functional Status/Changes:  []  No changes reported     I feel the pain in my R hip when I sleep on my side and sit on the couch. I take ibuprofen everyday and tylenol in between, especially before I go walking because of my hip. The stretching helps some. Will dry needling help?            Modalities Rationale:     decrease inflammation, decrease pain and increase tissue extensibility to improve patient's ability to perform ADLs   min [] Estim, type/location:                                      []  att     []  unatt     []  w/US     []  w/ice    []  w/heat    min []  Mechanical Traction: type/lbs                   []  pro   []  sup   []  int   []  cont    []  before manual    []  after manual    min []  Ultrasound, settings/location:      min []  Iontophoresis w/ dexamethasone, location:                                               []  take home patch       []  in clinic    min []  Ice     []  Heat    location/position:     min []  Vasopneumatic Device, press/temp:     min []  Other:    [x] Skin assessment post-treatment (if applicable):    [x]  intact    [x]  redness- no adverse reaction     []redness  adverse reaction:        40 min Therapeutic Exercise:  [x]  See flow sheet   Rationale:      increase ROM and increase strength to improve the patients ability to perform unlimted ADLs     10 min Manual Therapy: Technique:      [] S/DTM []IASTM []PROM  [] Passive Stretching  []manual TPR  [x]Jt manipulation:Gr I [] II []  III [x] IV[x] V[]  Treatment/Area:  R hip inf glides, long axis hip distraction   Rationale:      decrease pain, increase ROM, increase tissue extensibility and decrease trigger points to improve patient's ability to walk  The manual therapy interventions were performed at a separate and distinct time from the therapeutic activities interventions. Billed With/As:   [x] TE   [] TA   [] Neuro   [] Self Care Patient Education: [x] Review HEP    [] Progressed/Changed HEP based on:   [] positioning   [] body mechanics   [] transfers   [] heat/ice application    [x] other: Issued written HEP and reviewed     Other Objective/Functional Measures:    + R hip scouring for pain (approx 10-3 o'clock)  Dec R hip PROM into flex, IR/ER and dec R hip ER/ABD strength     Post Treatment Pain Level (on 0 to 10) scale:   1  / 10     ASSESSMENT  Assessment/Changes in Function:     Improved R hip mobility and dec c/o ERP after MT and stretching. []  See Progress Note/Recertification   Patient will continue to benefit from skilled PT services to modify and progress therapeutic interventions, address functional mobility deficits, address ROM deficits, address strength deficits, analyze and address soft tissue restrictions, analyze and cue movement patterns, analyze and modify body mechanics/ergonomics, assess and modify postural abnormalities, address imbalance/dizziness and instruct in home and community integration to attain remaining goals.    Progress toward goals / Updated goals:    Progressing towards HEP goals, added additonal exercises today     PLAN  [x]  Upgrade activities as tolerated YES Continue plan of care   []  Discharge due to :    []  Other:      Therapist: Tyra Wang, PT, DPT, MTC, CMTPT    Date: 12/28/2020 Time: 2:41 PM     Future Appointments   Date Time Provider Rose Haley   12/30/2020  9:45 AM Elisha Grover, PT [facility]   1/4/2021  8:00 AM Jeri Nguyen, PT [facility]   1/7/2021 10:15 AM Jeri Nguyen, PT [facility]   1/11/2021 10:15 AM Jeri Nguyen, PT [facility]   1/14/2021 11:15 AM Jeri Nguyen, PT [facility]   1/19/2021 11:45 AM Jeri Nguyen, PT [facility]   1/21/2021 11:45 AM Jeri Nguyen, PT [facility]   1/26/2021 11:45 AM Jeri Nguyen, PT [facility]   1/28/2021 11:15 AM Kelley Welch, PT [facility]

## 2020-12-30 ENCOUNTER — HOSPITAL ENCOUNTER (OUTPATIENT)
Dept: PHYSICAL THERAPY | Age: 50
Discharge: HOME | End: 2020-12-30
Payer: OTHER GOVERNMENT

## 2020-12-30 PROCEDURE — 97530 THERAPEUTIC ACTIVITIES: CPT

## 2020-12-30 PROCEDURE — 97110 THERAPEUTIC EXERCISES: CPT

## 2020-12-30 NOTE — PROGRESS NOTES
PHYSICAL THERAPY - DAILY TREATMENT NOTE    Patient Name: Myah Madsen        Date: 2020  : 1970   YES Patient  Verified  Visit #:   7   of   15  Insurance: Payor: RICHARD / Plan: Ravinder Aggarwal 74 / Product Type:  /      In time: 603 Out time: 6376   Total Treatment Time: 50     BCBS/Medicare Time Tracking (below)   Total Timed Codes (min):   1:1 Treatment Time:       TREATMENT AREA =  Low back pain [M54.5]    SUBJECTIVE  Pain Level (on 0 to 10 scale):  2   10   Medication Changes/New allergies or changes in medical history, any new surgeries or procedures?     NO    If yes, update Summary List   Subjective Functional Status/Changes:  []  No changes reported     My R hip has felt much better since last visit and I think the stretches/exercises are helping a lot      Modalities Rationale:     decrease inflammation, decrease pain and increase tissue extensibility to improve patient's ability to perform ADLs   min [] Estim, type/location:                                      []  att     []  unatt     []  w/US     []  w/ice    []  w/heat    min []  Mechanical Traction: type/lbs                   []  pro   []  sup   []  int   []  cont    []  before manual    []  after manual    min []  Ultrasound, settings/location:      min []  Iontophoresis w/ dexamethasone, location:                                               []  take home patch       []  in clinic    min []  Ice     []  Heat    location/position:     min []  Vasopneumatic Device, press/temp:     min []  Other:    [x] Skin assessment post-treatment (if applicable):    [x]  intact    [x]  redness- no adverse reaction     []redness  adverse reaction:        30 min Therapeutic Exercise:  [x]  See flow sheet   Rationale:      increase ROM and increase strength to improve the patients ability to perform unlimted ADLs     20 min Therapeutic Activity: [x]  See flow sheet   Rationale:    increase ROM, increase strength, improve coordination, improve balance and increase proprioception to improve the patients ability to perform ADLs      Billed With/As:   [x] TE   [] TA   [] Neuro   [] Self Care Patient Education: [x] Review HEP    [] Progressed/Changed HEP based on:   [] positioning   [] body mechanics   [] transfers   [] heat/ice application    [x] other:      Other Objective/Functional Measures:    + R hip scouring for pain (approx 10-3 o'clock)  Dec R hip PROM into flex, IR/ER and dec R hip ER/ABD strength     Post Treatment Pain Level (on 0 to 10) scale:   1-2  / 10     ASSESSMENT  Assessment/Changes in Function:     Reviewed at length the importance of proper body mechanics and avoiding loaded lumbar flexion. Reviewed golfers lift and squatting, weight shifting when vacuuming/sweeping, modifications when performing household chores and yard work and the importance of 100% compliance with correct poor mechanical habits to dec SX exacerbation and inflammation. []  See Progress Note/Recertification   Patient will continue to benefit from skilled PT services to modify and progress therapeutic interventions, address functional mobility deficits, address ROM deficits, address strength deficits, analyze and address soft tissue restrictions, analyze and cue movement patterns, analyze and modify body mechanics/ergonomics, assess and modify postural abnormalities, address imbalance/dizziness and instruct in home and community integration to attain remaining goals. Progress toward goals / Updated goals:    Progressing towards HEP goals, inconsistent compliance with performing ADLs and household chores using proper body mechanics.      PLAN  [x]  Upgrade activities as tolerated YES Continue plan of care   []  Discharge due to :    []  Other:      Therapist: Armin Beasley, PT, DPT, MTC, CMTPT    Date: 12/30/2020 Time: 2:41 PM     Future Appointments   Date Time Provider Rose De Leon   1/4/2021  8:00 AM Arturo Welch, PT [facility] 1/7/2021 10:15 AM Ankit Rodriguez, PT [facility]   1/11/2021 10:15 AM Ankit Rodriguez, PT [facility]   1/14/2021 11:15 AM Ankit Rodriguez, PT [facility]   1/19/2021 11:45 AM Ankit Rodriguez, PT [facility]   1/21/2021 11:45 AM Ankit Rodriguez, PT [facility]   1/26/2021 11:45 AM Ankit Rodriguez, PT [facility]   1/28/2021 11:15 AM eNry Welch, PT [facility]

## 2021-01-04 ENCOUNTER — HOSPITAL ENCOUNTER (OUTPATIENT)
Dept: PHYSICAL THERAPY | Age: 51
Discharge: HOME | End: 2021-01-04
Payer: OTHER GOVERNMENT

## 2021-01-04 PROCEDURE — 97110 THERAPEUTIC EXERCISES: CPT

## 2021-01-04 NOTE — PROGRESS NOTES
[id number] PHYSICAL THERAPY  [facility]  [address], [address], [address]  Phone: [phone]  Fax: [phone]  PROGRESS NOTE  Patient Name: Cristoabl Mccarty : 1970   Treatment/Medical Diagnosis: Low back pain [M54.5]   Referral Source: Vanessa Espinoza MD     Date of Initial Visit: 12-10-20 Attended Visits: 7 Missed Visits: 0     SUMMARY OF TREATMENT  Therapeutic exercise including ROM, stretching, gentle strengthening, stabilization training, postural ed, patient education, HEP instruction, MHP. CURRENT STATUS  Mrs. Lisa Whaley continues to make steady progress with pain reduction & reports minimal c/o LBP & previous c/o L radicular pain have also improved since surgery. Dural tension signs on L are now (-) & no longer reproduces L LE pain or lower back pain. She reports intermittent c/o pain when she moves too quickly, such as with bed mobility & transfers as well as heavy housework such vacuuming. She also reports primary c/o pain in R hip pain in R groin region with prolonged sitting, upon rising as well as ambulation when taking longer strides, 6/10 at worst.  Pain provocation tests were (+) on R indicating possible R hip dysfunction/arthritis, she was issued some gentle stretches for home which as provided some relief with PT. Current c/o R hip pain limiting progressions in PT. Goal/Measure of Progress Goal Met? 1.  Establish HEP to prevent further disability. Status at last Eval: NA Current Status: Good compliance with HEP yes   2. Patient will report decreased c/o pain to < or = 1-2/10 to facilitate return to PLOF with manageable sx in back. Status at last Eval: 2-3/10 at worst  2/10 average Current Status: 2/10 at worst  2/10 average  0/10 at best yes   3. Improve FOTO score from 33 points to > or = 43 points indicating improved tolerance with ADLs in regards to back. Status at last Eval: 33 Current Status: 67 yes   4.   Patient to perform all bed mobility & sit to stand transfers with no increase in LBP or L LE pain   Status at last Eval: Limited by pain Current Status: Intermittent c/o pain, although improved  progressing     New Goals to be achieved in __3-4__  weeks:  1. Patient to perform all bed mobility & sit to stand transfers with no increase in LBP or L LE pain. 2.  Patient to report 50% improvement in overall function in preparation for return to recreational activities with manageable sx in lower back. 3.  Improve overall strength of B hips to 5-/5 with no c/o pain upon MMT so strength is available for return to walking program.   4.  Patient to be independent & compliant with HEP in preparation for D/C.       RECOMMENDATIONS  Patient to continue with PT for up to 3-4 more visits in order to progress towards achieving all LTGs  If you have any questions/comments please contact us directly at [phone]. Thank you for allowing us to assist in the care of your patient. Therapist Signature: JOE Aguirre, cert MDT Date: 9/0/6832     Time: 8:28 AM   NOTE TO PHYSICIAN:  PLEASE COMPLETE THE ORDERS BELOW AND FAX TO   [facility] [place] [facility]: [phone]. If you are unable to process this request in 24 hours please contact our office: [phone].    ___ I have read the above report and request that my patient continue as recommended.   ___ I have read the above report and request that my patient continue therapy with the following changes/special instructions:_________________________________________________________   ___ I have read the above report and request that my patient be discharged from therapy.      Physician Signature:        Date:       Time:

## 2021-01-04 NOTE — PROGRESS NOTES
PHYSICAL THERAPY - DAILY TREATMENT NOTE    Patient Name: Tatianna Bear        Date: 2021  : 1970   YES Patient  Verified  Visit #:     Insurance: Payor:  / Plan: Ravinder Aggarwal 74 / Product Type:  /      In time: 8 A Out time: 9:05 A   Total Treatment Time: 60     BCBS/Medicare Time Tracking (below)   Total Timed Codes (min):  NA 1:1 Treatment Time:  NA     TREATMENT AREA = Low back pain [M54.5]    SUBJECTIVE  Pain Level (on 0 to 10 scale):  2  / 10   Medication Changes/New allergies or changes in medical history, any new surgeries or procedures?     NO    If yes, update Summary List   Subjective Functional Status/Changes:  []  No changes reported     See progress note to MD           Modalities Rationale:     decrease pain to improve patient's ability to return to pain-free ADLs   min [] Estim, type/location:                                      []  att     []  unatt     []  w/US     []  w/ice    []  w/heat    min []  Mechanical Traction: type/lbs                   []  pro   []  sup   []  int   []  cont    []  before manual    []  after manual    min []  Ultrasound, settings/location:      min []  Iontophoresis w/ dexamethasone, location:                                               []  take home patch       []  in clinic   10 min []  Ice     [x]  Heat    location/position: Supine to l/s    min []  Vasopneumatic Device, press/temp:     min []  Other:    [] Skin assessment post-treatment (if applicable):    [x]  intact    [x]  redness- no adverse reaction     []redness  adverse reaction:        50 min Therapeutic Exercise:  [x]  See flow sheet   Rationale:      increase ROM, increase strength, improve balance and increase proprioception to improve the patients ability to return to pain-free walking program     Billed With/As:   [] TE   [] TA   [] Neuro   [] Self Care Patient Education: [x] Review HEP    [] Progressed/Changed HEP based on:   [] positioning   [] body mechanics   [] transfers   [] heat/ice application    [] other:      Other Objective/Functional Measures:    FOTO 67 points     Post Treatment Pain Level (on 0 to 10) scale:   2  / 10     ASSESSMENT  Assessment/Changes in Function:     Slow progress in regards to R hip pain, difficulty with progression of PT due to R hip/groin pain, tests suggest possible R hip arthritis/degenerative changes     []  See Progress Note/Recertification   Patient will continue to benefit from skilled PT services to modify and progress therapeutic interventions, address functional mobility deficits, address ROM deficits, address strength deficits and instruct in home and community integration to attain remaining goals.    Progress toward goals / Updated goals:    See progress note to MD     PLAN  []  Upgrade activities as tolerated YES Continue plan of care   []  Discharge due to :    []  Other:      Therapist: Toshia Shukla PT    Date: 1/4/2021 Time: 8:28 AM     Future Appointments   Date Time Provider Rose De Leon   1/7/2021 10:15 AM [place] Speak, PT [facility] [address]   1/11/2021 10:15 AM Dru Speak, PT [facility] [address]   1/14/2021 11:15 AM Dru Speak, PT [facility] [address]   1/19/2021 11:45 AM [place] Speak, PT [facility] [address]   1/21/2021 11:45 AM [place] Speak, PT [facility] [address]   1/26/2021 11:45 AM [place] Speak, PT [facility] [address]   1/28/2021 11:15 AM Ezekiel Welch, PT [facility] [address]

## 2021-01-07 ENCOUNTER — HOSPITAL ENCOUNTER (OUTPATIENT)
Dept: PHYSICAL THERAPY | Age: 51
Discharge: HOME | End: 2021-01-07
Payer: OTHER GOVERNMENT

## 2021-01-07 PROCEDURE — 97110 THERAPEUTIC EXERCISES: CPT

## 2021-01-07 NOTE — PROGRESS NOTES
;PHYSICAL THERAPY - DAILY TREATMENT NOTE    Patient Name: Cookie Becerra        Date: 2021  : 1970   YES Patient  Verified  Visit #:     Insurance: Payor:  / Plan: Ravinder Aggarwal 74 / Product Type:  /      In time: 10:40 A Out time: 11:40 A   Total Treatment Time: 55     BCBS/Medicare Time Tracking (below)   Total Timed Codes (min):  NA 1:1 Treatment Time:  NA     TREATMENT AREA = Low back pain [M54.5]    SUBJECTIVE  Pain Level (on 0 to 10 scale):  2-3  / 10   Medication Changes/New allergies or changes in medical history, any new surgeries or procedures? YES    If yes, update Summary List   Subjective Functional Status/Changes:  []  No changes reported     Patient reports she had f/u with MD & they updated her script to include her R hip, she had X-rays & she has some arthritis in her hip that wasn't too significant. but if she is not better with PT they may do an MRI & possibly an injection next visit.  See updates to med log after f/u with MD.           Modalities Rationale:   Patient deferred     min [] Estim, type/location:                                      []  att     []  unatt     []  w/US     []  w/ice    []  w/heat    min []  Mechanical Traction: type/lbs                   []  pro   []  sup   []  int   []  cont    []  before manual    []  after manual    min []  Ultrasound, settings/location:      min []  Iontophoresis w/ dexamethasone, location:                                               []  take home patch       []  in clinic    min []  Ice     []  Heat    location/position:     min []  Vasopneumatic Device, press/temp:     min []  Other:    [] Skin assessment post-treatment (if applicable):    []  intact    []  redness- no adverse reaction     []redness  adverse reaction:        55 min Therapeutic Exercise:  [x]  See flow sheet   Rationale:      increase ROM, increase strength, improve balance and increase proprioception to improve the patients ability to return to pain-free standing     Billed With/As:   [] TE   [] TA   [] Neuro   [] Self Care Patient Education: [x] Review HEP    [] Progressed/Changed HEP based on:   [] positioning   [] body mechanics   [] transfers   [] heat/ice application    [] other:      Other Objective/Functional Measures:    Resumed l/s exercises per flow sheet, added hip ER stretch, GTB to bridge & hip ABD     Post Treatment Pain Level (on 0 to 10) scale:   5  / 10     ASSESSMENT  Assessment/Changes in Function:     Good tolerance to today's treatment,  No increase in pain in R hip or l/s today      []  See Progress Note/Recertification   Patient will continue to benefit from skilled PT services to modify and progress therapeutic interventions, address functional mobility deficits, address ROM deficits, address strength deficits, analyze and modify body mechanics/ergonomics, assess and modify postural abnormalities and instruct in home and community integration to attain remaining goals.    Progress toward goals / Updated goals:    Progressing towards LTG 2      PLAN  []  Upgrade activities as tolerated YES Continue plan of care   []  Discharge due to :    []  Other:      Therapist: Garima Sanchez PT    Date: 1/7/2021 Time: 10:36 AM     Future Appointments   Date Time Provider Rose De Leon   1/11/2021 10:15 AM Mable Copas, PT [facility]   1/14/2021 11:15 AM Mable Copas, PT [facility]   1/19/2021 11:45 AM Mable Copas, PT [facility]   1/21/2021 11:45 AM Mable Copas, PT [facility]   1/26/2021 11:45 AM Mable Copas, PT [facility]   1/28/2021 11:15 AM John Welch , PT [facility]

## 2021-01-11 ENCOUNTER — HOSPITAL ENCOUNTER (OUTPATIENT)
Dept: PHYSICAL THERAPY | Age: 51
Discharge: HOME | End: 2021-01-11
Payer: OTHER GOVERNMENT

## 2021-01-11 PROCEDURE — 97110 THERAPEUTIC EXERCISES: CPT

## 2021-01-11 NOTE — PROGRESS NOTES
;PHYSICAL THERAPY - DAILY TREATMENT NOTE    Patient Name: Cookie Becerra        Date: 2021  : 1970   YES Patient  Verified  Visit #:   10   of   12  Insurance: Payor:  / Plan: Ravinder Aggarwal 74 / Product Type:  /      In time: 10:20 A Out time: 11:30 A   Total Treatment Time: 65     BCBS/Medicare Time Tracking (below)   Total Timed Codes (min):  NA 1:1 Treatment Time:  NA     TREATMENT AREA = Low back pain [M54.5]    SUBJECTIVE  Pain Level (on 0 to 10 scale):  2-3  / 10   Medication Changes/New allergies or changes in medical history, any new surgeries or procedures?     NO    If yes, update Summary List   Subjective Functional Status/Changes:  []  No changes reported     Patient reports she had been feeling better, her R hip is not hurting as much as it did before with new meds & stretches          Modalities Rationale:   Decrease pain to return to PLOF     min [] Estim, type/location:                                      []  att     []  unatt     []  w/US     []  w/ice    []  w/heat    min []  Mechanical Traction: type/lbs                   []  pro   []  sup   []  int   []  cont    []  before manual    []  after manual    min []  Ultrasound, settings/location:      min []  Iontophoresis w/ dexamethasone, location:                                               []  take home patch       []  in clinic   10 min []  Ice     [x]  Heat    location/position: Supine to l/s     min []  Vasopneumatic Device, press/temp:     min []  Other:    [] Skin assessment post-treatment (if applicable):    []  intact    []  redness- no adverse reaction     []redness  adverse reaction:        55/  40 min Therapeutic Exercise:  [x]  See flow sheet   Rationale:      increase ROM, increase strength, improve balance and increase proprioception to improve the patients ability to return to pain-free walking program    Billed With/As:   [] TE   [] TA   [] Neuro   [] Self Care Patient Education: [x] Review HEP [] Progressed/Changed HEP based on:   [] positioning   [] body mechanics   [] transfers   [] heat/ice application    [] other:      Other Objective/Functional Measures: Added seated l/s stabilization, swiss ball abd draw     Post Treatment Pain Level (on 0 to 10) scale:   1  / 10     ASSESSMENT  Assessment/Changes in Function:     Improved tolerance to treatment, v/c for upright posture with unsupported sitting activities     []  See Progress Note/Recertification   Patient will continue to benefit from skilled PT services to modify and progress therapeutic interventions, address functional mobility deficits, address ROM deficits, address strength deficits, analyze and modify body mechanics/ergonomics, assess and modify postural abnormalities and instruct in home and community integration to attain remaining goals.    Progress toward goals / Updated goals:    Progressing towards LTG 2      PLAN  []  Upgrade activities as tolerated YES Continue plan of care   []  Discharge due to :    []  Other:      Therapist: Maci Lugo, LUCIEN    Date: 1/11/2021 Time: 11:30 AM     Future Appointments   Date Time Provider Rose De Leon   1/14/2021 11:15 AM Lobo Reaves, PT [facility]   1/19/2021 11:45 AM Lobo Reaves, PT [facility]   1/21/2021 11:45 AM Lobo Reaves, PT [facility]   1/26/2021 11:45 AM Lobo Reaves, PT [facility]   1/28/2021 11:15 AM Evaristo Welch Si, PT [facility]

## 2021-01-14 ENCOUNTER — HOSPITAL ENCOUNTER (OUTPATIENT)
Dept: PHYSICAL THERAPY | Age: 51
Discharge: HOME | End: 2021-01-14
Payer: OTHER GOVERNMENT

## 2021-01-14 PROCEDURE — 97110 THERAPEUTIC EXERCISES: CPT

## 2021-01-14 NOTE — PROGRESS NOTES
PHYSICAL THERAPY - DAILY TREATMENT NOTE    Patient Name: Myah Madsen        Date: 2021  : 1970   YES Patient  Verified  Visit #:     Insurance: Payor: RICHARD / Plan: Ravinder Aggarwal 74 / Product Type:  /      In time: 11:15 A Out time: 12:15 P   Total Treatment Time: 55     BCBS/Medicare Time Tracking (below)   Total Timed Codes (min):  NA 1:1 Treatment Time:  NA     TREATMENT AREA = Low back pain [M54.5]    SUBJECTIVE  Pain Level (on 0 to 10 scale):  1-2  / 10   Medication Changes/New allergies or changes in medical history, any new surgeries or procedures? NO    If yes, update Summary List   Subjective Functional Status/Changes:  []  No changes reported     Patient reports no new complaints since last visit, her pain is less & she is able to walk better.              Modalities Rationale:     decrease pain to improve patient's ability to return to pain-free ADLs    min [] Estim, type/location:                                      []  att     []  unatt     []  w/US     []  w/ice    []  w/heat    min []  Mechanical Traction: type/lbs                   []  pro   []  sup   []  int   []  cont    []  before manual    []  after manual    min []  Ultrasound, settings/location:      min []  Iontophoresis w/ dexamethasone, location:                                               []  take home patch       []  in clinic   10 min []  Ice     [x]  Heat    location/position: Supine to l/s     min []  Vasopneumatic Device, press/temp:     min []  Other:    [] Skin assessment post-treatment (if applicable):    [x]  intact    []  redness- no adverse reaction     []redness  adverse reaction:        45 min Therapeutic Exercise:  [x]  See flow sheet   Rationale:      increase ROM and increase strength to improve the patients ability to return to pain-free standing     Billed With/As:   [] TE   [] TA   [] Neuro   [] Self Care Patient Education: [x] Review HEP    [] Progressed/Changed HEP based on:   [] positioning   [] body mechanics   [] transfers   [] heat/ice application    [] other:      Other Objective/Functional Measures: Added R SLS on blue disc, hip ABD with peach band, step down off 4 inch step (lateral)     Post Treatment Pain Level (on 0 to 10) scale:   1  / 10     ASSESSMENT  Assessment/Changes in Function:     Weakness reported in R hip with today's weightbearing progression, no increase in pain      []  See Progress Note/Recertification   Patient will continue to benefit from skilled PT services to modify and progress therapeutic interventions, address functional mobility deficits, address ROM deficits, address strength deficits, analyze and address soft tissue restrictions, analyze and cue movement patterns, assess and modify postural abnormalities, address imbalance/dizziness and instruct in home and community integration to attain remaining goals.    Progress toward goals / Updated goals:    Progressing towards LTG 2 & 3     PLAN  []  Upgrade activities as tolerated YES Continue plan of care   []  Discharge due to :    []  Other:      Therapist: Jes Herman PT    Date: 1/14/2021 Time: 1:35 PM     Future Appointments   Date Time Provider Rose De Leon   1/19/2021 11:45 AM Kaleigh Ling, PT [facility]   1/21/2021 11:45 AM Kaleigh Ling, PT [facility]   1/26/2021 11:45 AM Kaleigh Ling, PT [facility]   1/28/2021 11:15 AM Bryn Welch, PT [facility]

## 2021-01-19 ENCOUNTER — HOSPITAL ENCOUNTER (OUTPATIENT)
Dept: PHYSICAL THERAPY | Age: 51
Discharge: HOME | End: 2021-01-19
Payer: OTHER GOVERNMENT

## 2021-01-19 PROCEDURE — 97110 THERAPEUTIC EXERCISES: CPT

## 2021-01-19 NOTE — PROGRESS NOTES
PHYSICAL THERAPY - DAILY TREATMENT NOTE    Patient Name: Rico Leal        Date: 2021  : 1970   YES Patient  Verified  Visit #:     Insurance: Payor: RICHARD / Plan: Ravinder Aggarwal 74 / Product Type:  /      In time: 11:50 A Out time: 1:00 P   Total Treatment Time: 65     BCBS/Medicare Time Tracking (below)   Total Timed Codes (min):  55 1:1 Treatment Time:  40     TREATMENT AREA = Low back pain [M54.5]    SUBJECTIVE  Pain Level (on 0 to 10 scale):  2-3  / 10   Medication Changes/New allergies or changes in medical history, any new surgeries or procedures?     NO    If yes, update Summary List   Subjective Functional Status/Changes:  []  No changes reported     Patient reports she forgot to take her meloxicam over the weekend & she could tell she had some pain/           Modalities Rationale:     decrease edema, decrease inflammation and decrease pain to improve patient's ability to return to pain-free lifting   min [] Estim, type/location:                                      []  att     []  unatt     []  w/US     []  w/ice    []  w/heat    min []  Mechanical Traction: type/lbs                   []  pro   []  sup   []  int   []  cont    []  before manual    []  after manual    min []  Ultrasound, settings/location:      min []  Iontophoresis w/ dexamethasone, location:                                               []  take home patch       []  in clinic   10 min [x]  Ice     []  Heat    location/position: R hip in supine    min []  Vasopneumatic Device, press/temp:     min []  Other:    [] Skin assessment post-treatment (if applicable):    [x]  intact    []  redness- no adverse reaction     []redness  adverse reaction:        55/  40 min Therapeutic Exercise:  [x]  See flow sheet   Rationale:      increase ROM, increase strength and improve balance to improve the patients ability to return to pain-free walking program     Billed With/As:   [] TE   [] TA   [] Neuro   [] Self Care Patient Education: [x] Review HEP    [] Progressed/Changed HEP based on:   [] positioning   [] body mechanics   [] transfers   [] heat/ice application    [] other:      Other Objective/Functional Measures:    See flow sheet, progressed SLS per flow sheet      Post Treatment Pain Level (on 0 to 10) scale:   2  / 10     ASSESSMENT  Assessment/Changes in Function:     Good tolerance to today's treatment, no increase in pain     []  See Progress Note/Recertification   Patient will continue to benefit from skilled PT services to modify and progress therapeutic interventions, address functional mobility deficits, address ROM deficits, address strength deficits, assess and modify postural abnormalities, address imbalance/dizziness and instruct in home and community integration to attain remaining goals.    Progress toward goals / Updated goals:    Progressing towards LTG 2 & 3     PLAN  []  Upgrade activities as tolerated YES Continue plan of care   []  Discharge due to :    []  Other:      Therapist: Emanuel Golden PT    Date: 1/19/2021 Time: 1:47 PM     Future Appointments   Date Time Provider Rose De Leon   1/21/2021 11:45 AM Lani Levin, PT [facility]   1/26/2021 11:45 AM Lani Levin PT [facility]   1/28/2021 11:15 AM Fatuma Welch, PT [facility]

## 2021-01-21 ENCOUNTER — HOSPITAL ENCOUNTER (OUTPATIENT)
Dept: PHYSICAL THERAPY | Age: 51
Discharge: HOME | End: 2021-01-21
Payer: OTHER GOVERNMENT

## 2021-01-21 PROCEDURE — 97110 THERAPEUTIC EXERCISES: CPT

## 2021-01-21 NOTE — PROGRESS NOTES
PHYSICAL THERAPY - DAILY TREATMENT NOTE    Patient Name: Bear Glasgow        Date: 2021  : 1970   YES Patient  Verified  Visit #:   15   of   15  Insurance: Payor: RICHARD / Plan: Ravinder Aggarwal 74 / Product Type:  /      In time: 11:45 A Out time: 12:40 A   Total Treatment Time: 50     BCBS/Medicare Time Tracking (below)   Total Timed Codes (min):  NA 1:1 Treatment Time:  NA     TREATMENT AREA = Low back pain [M54.5]    SUBJECTIVE  Pain Level (on 0 to 10 scale):  2-3  / 10   Medication Changes/New allergies or changes in medical history, any new surgeries or procedures? NO    If yes, update Summary List   Subjective Functional Status/Changes:  []  No changes reported     Patient reports that she has not taken the meloxican since Tuesday & she wants to see if she can manage her pain without it, pain usually 3-4/10 for most of the day. Pain in her R hip is more on the outside of her hip & not as much in her groin.            Modalities Rationale:     decrease pain and increase tissue extensibility to improve patient's ability to return to pain-free standing    min [] Estim, type/location:                                      []  att     []  unatt     []  w/US     []  w/ice    []  w/heat    min []  Mechanical Traction: type/lbs                   []  pro   []  sup   []  int   []  cont    []  before manual    []  after manual    min []  Ultrasound, settings/location:      min []  Iontophoresis w/ dexamethasone, location:                                               []  take home patch       []  in clinic   10 min []  Ice     [x]  Heat    location/position: Supine to to l/s     min []  Vasopneumatic Device, press/temp:     min []  Other:    [] Skin assessment post-treatment (if applicable):    [x]  intact    [x]  redness- no adverse reaction     []redness  adverse reaction:        40 min Therapeutic Exercise:  [x]  See flow sheet   Rationale:      increase ROM and increase strength to improve the patients ability to return to pain-free bed mobility     Billed With/As:   [] TE   [] TA   [] Neuro   [] Self Care Patient Education: [x] Review HEP    [] Progressed/Changed HEP based on:   [] positioning   [] body mechanics   [] transfers   [] heat/ice application    [] other:      Other Objective/Functional Measures:    Resumed sit to stand with dowel    FIS 50% PDM, EIS 50% ERP, c/o pain with R>L SGIS     Post Treatment Pain Level (on 0 to 10) scale:   2 / 10     ASSESSMENT  Assessment/Changes in Function:     Slow progress with pain reduction, c/o L sided buttock pain decreased with use of TA contraction      []  See Progress Note/Recertification   Patient will continue to benefit from skilled PT services to modify and progress therapeutic interventions, address functional mobility deficits, address ROM deficits, address strength deficits, analyze and address soft tissue restrictions, analyze and cue movement patterns, analyze and modify body mechanics/ergonomics, assess and modify postural abnormalities and instruct in home and community integration to attain remaining goals.    Progress toward goals / Updated goals:    Progressing towards LTG 3      PLAN  []  Upgrade activities as tolerated YES Continue plan of care   []  Discharge due to :    []  Other:      Therapist: Emanuel Golden PT    Date: 1/21/2021 Time: 1:59 PM     Future Appointments   Date Time Provider Rose De Leon   1/26/2021 11:45 AM Lani Lyme, PT [facility]   1/28/2021 11:15 AM Lani Lyme, PT [facility]   2/2/2021 11:00 AM Lani Lyme, PT [facility]   2/4/2021 11:00 AM Lani Lyme, PT [facility]   2/9/2021 12:45 PM Lani Lyme, PT [facility]   2/11/2021  2:15 PM Lani Lyme, PT [facility]   2/16/2021 11:00 AM Lani Lyme, PT [facility]   2/18/2021 11:00 AM Lani Levin PT [facility]   2/23/2021 11:00 AM Lani Levin PT [facility]   2/26/2021  8:45 AM Deanna Welch, PT [facility]

## 2021-01-26 ENCOUNTER — HOSPITAL ENCOUNTER (OUTPATIENT)
Dept: PHYSICAL THERAPY | Age: 51
Discharge: HOME | End: 2021-01-26
Payer: OTHER GOVERNMENT

## 2021-01-26 PROCEDURE — 97110 THERAPEUTIC EXERCISES: CPT

## 2021-01-26 NOTE — PROGRESS NOTES
PHYSICAL THERAPY - DAILY TREATMENT NOTE    Patient Name: Rika Bobo        Date: 2021  : 1970   YES Patient  Verified  Visit #:   14   of   15  Insurance: Payor: RICHARD / Plan: Ravinder Aggarwal 74 / Product Type:  /      In time: 11:45 A Out time: 12:40 P   Total Treatment Time: 50     BCBS/Medicare Time Tracking (below)   Total Timed Codes (min):  NA 1:1 Treatment Time:  NA     TREATMENT AREA = Low back pain [M54.5]    SUBJECTIVE  Pain Level (on 0 to 10 scale):  2  / 10   Medication Changes/New allergies or changes in medical history, any new surgeries or procedures? NO    If yes, update Summary List   Subjective Functional Status/Changes:  []  No changes reported     Patient reports she had not taken her meloxicam in the last week & she has been feeling ok, she has not had the sharp pain in her R hip when she is getting up from a chair.            Modalities Rationale:     decrease edema, decrease inflammation and decrease pain to improve patient's ability to return to pain-free standing   min [] Estim, type/location:                                      []  att     []  unatt     []  w/US     []  w/ice    []  w/heat    min []  Mechanical Traction: type/lbs                   []  pro   []  sup   []  int   []  cont    []  before manual    []  after manual    min []  Ultrasound, settings/location:      min []  Iontophoresis w/ dexamethasone, location:                                               []  take home patch       []  in clinic   10 min [x]  Ice     [x]  Heat    location/position:     min []  Vasopneumatic Device, press/temp:     min []  Other:    [] Skin assessment post-treatment (if applicable):    [x]  intact    [x]  redness- no adverse reaction     []redness  adverse reaction:        40 min Therapeutic Exercise:  [x]  See flow sheet   Rationale:      increase ROM and increase strength to improve the patients ability to return to  Pain-free walking program      Billed With/As:   [] TE   [] TA   [] Neuro   [] Self Care Patient Education: [x] Review HEP    [] Progressed/Changed HEP based on:   [] positioning   [] body mechanics   [] transfers   [] heat/ice application    [] other:      Other Objective/Functional Measures:    See flow sheet      Post Treatment Pain Level (on 0 to 10) scale:   2  / 10     ASSESSMENT  Assessment/Changes in Function:     Good tolerance to current program     []  See Progress Note/Recertification   Patient will continue to benefit from skilled PT services to modify and progress therapeutic interventions, address functional mobility deficits, address ROM deficits, address strength deficits, analyze and cue movement patterns, analyze and modify body mechanics/ergonomics, assess and modify postural abnormalities and instruct in home and community integration to attain remaining goals.    Progress toward goals / Updated goals:    Progressing towards LTG 1      PLAN  []  Upgrade activities as tolerated YES Continue plan of care   []  Discharge due to :    [x]  Other: Progress note n/v for insurance authorization      Therapist: Rolly Cosme PT    Date: 1/26/2021 Time: 1:09 PM     Future Appointments   Date Time Provider Rose De Leon   1/28/2021 11:15 AM Catie Keokuk, PT [facility]   2/2/2021 11:00 AM Catie Keokuk, PT [facility]   2/4/2021 11:00 AM Catie Keokuk, PT [facility]   2/9/2021 12:45 PM Catie Keokuk, PT [facility]   2/11/2021  2:15 PM Catie Keokuk, PT [facility]   2/16/2021 11:00 AM [facility]uk, PT [facility]   2/18/2021 11:00 AM Catie Keokuk, PT [facility]   2/23/2021 11:00 AM Catie Keokuk, PT [facility]   2/26/2021  8:45 AM Nelson Welch, PT [facility]

## 2021-01-28 ENCOUNTER — HOSPITAL ENCOUNTER (OUTPATIENT)
Dept: PHYSICAL THERAPY | Age: 51
Discharge: HOME | End: 2021-01-28
Payer: OTHER GOVERNMENT

## 2021-01-28 PROCEDURE — 97110 THERAPEUTIC EXERCISES: CPT

## 2021-01-28 NOTE — PROGRESS NOTES
[facility] PHYSICAL THERAPY AT [address] [place] [address]  [address]omes [address], [address] ,[address], [address]  Phone: [phone]  Fax: [phone]  PROGRESS NOTE  Patient Name: Jerry Eagle : 1970   Treatment/Medical Diagnosis: Low back pain [M54.5]   Referral Source: Sherly Castellon MD     Date of Initial Visit: 12-10-20 Attended Visits: 15 Missed Visits: 0     SUMMARY OF TREATMENT  Therapeutic exercise including ROM, stretching, gentle strengthening, stabilization training, postural ed, patient education, HEP instruction, MHP. CURRENT STATUS  Mrs. Nanci Torres continues to make steady progress with pain reduction & continues to report intermittent c/o LBP as well as L LE pain that at times radiates to level of posterior thigh. Dural tension signs are mildly (+) for pain reproduction. She is eager to get off of meds & has only taken meloxicam 1 time in the last week. She is currently managing her pain with home program.  She reports fairly constant c/o pain in R hip with prolonged sitting as well as with rising from seated position, prolonged ambulation, specifically when she takes longer strides. Although she believes R hip pain has decreased in intensity since last f/u with MD on 21. Average pain overall is 1-2/10, 3-4/10 at worst. Please see below for other improvements with PT. Goal/Measure of Progress Goal Met? 1. Patient to perform all bed mobility & sit to stand transfers with no increase in LBP or L LE pain   Status at last Eval: Intermittent c/o pain, although improved  Current Status: Intermittent c/o pain, although pain less intense per pt report progressing   2. Patient to report 50% improvement in overall function in preparation for return to recreational activities with manageable sx in lower back. Status at last Eval: NA Current Status: To be assessed  progressing   3.   Improve overall strength of B hips to 5-/5 with no c/o pain upon MMT so strength is available for return to walking program.   Status at last Eval: Limited by pain Current Status: 4/5 with slight c/o pain upon MMT of R hip ABD, ext, flex progressing     New Goals to be achieved in __3-4__  weeks:  1. Patient to perform all bed mobility & sit to stand transfers with no increase in LBP or L LE pain. 2.  Patient to report 50% improvement in overall function in preparation for return to recreational activities with manageable sx in lower back. 3.  Improve overall strength of B hips to 4+ to 5-/5 with no c/o pain upon MMT so strength is available for return to walking program.   4.  Patient to be independent & compliant with HEP in preparation for D/C.       RECOMMENDATIONS  Patient to continue with PT for up to 3-4 more visits in order to progress towards achieving all LTGs  If you have any questions/comments please contact us directly at [phone]. Thank you for allowing us to assist in the care of your patient. Therapist Signature: JOE Shipman, cert MDT Date: 4/28/3977     Time: 11:15 AM   NOTE TO PHYSICIAN:  PLEASE COMPLETE THE ORDERS BELOW AND FAX TO   [facility]: [phone]. If you are unable to process this request in 24 hours please contact our office: [phone].    ___ I have read the above report and request that my patient continue as recommended.   ___ I have read the above report and request that my patient continue therapy with the following changes/special instructions:_________________________________________________________   ___ I have read the above report and request that my patient be discharged from therapy.      Physician Signature:        Date:       Time:

## 2021-01-28 NOTE — PROGRESS NOTES
PHYSICAL THERAPY - DAILY TREATMENT NOTE    Patient Name: Aysha Sanchez        Date: 2021  : 1970   YES Patient  Verified  Visit #:   15   of   15  Insurance: Payor: RICHARD / Plan: Ravinder Aggarwal 74 / Product Type:  /       In time: 11:15 A Out time: 12:30 P   Total Treatment Time: 70     BCBS/Medicare Time Tracking (below)   Total Timed Codes (min):  NA 1:1 Treatment Time:  NA     TREATMENT AREA = Low back pain [M54.5]    SUBJECTIVE  Pain Level (on 0 to 10 scale):  2  / 10   Medication Changes/New allergies or changes in medical history, any new surgeries or procedures?     NO    If yes, update Summary List   Subjective Functional Status/Changes:  []  No changes reported     See progress note            Modalities Rationale:     decrease pain to improve patient's ability to return to pain-free ADLs    min [] Estim, type/location:                                      []  att     []  unatt     []  w/US     []  w/ice    []  w/heat    min []  Mechanical Traction: type/lbs                   []  pro   []  sup   []  int   []  cont    []  before manual    []  after manual    min []  Ultrasound, settings/location:      min []  Iontophoresis w/ dexamethasone, location:                                               []  take home patch       []  in clinic   10 min []  Ice     [x]  Heat    location/position: Supine to l/s     min []  Vasopneumatic Device, press/temp:     min []  Other:    [] Skin assessment post-treatment (if applicable):    [x]  intact    [x]  redness- no adverse reaction     []redness  adverse reaction:        60 min Therapeutic Exercise:  [x]  See flow sheet   Rationale:      increase ROM and increase strength to improve the patients ability to return to pain-free transfers      Billed With/As:   [] TE   [] TA   [] Neuro   [] Self Care Patient Education: [x] Review HEP    [] Progressed/Changed HEP based on:   [] positioning   [] body mechanics   [] transfers   [] heat/ice application    [] other:      Other Objective/Functional Measures:    FOTO 67  SLR (+) on L  R hip pain provocation tests (+) mildly   MMT overall hip 4/5     Post Treatment Pain Level (on 0 to 10) scale:   2  / 10     ASSESSMENT  Assessment/Changes in Function:     Slow progress with pain reduction since patient has weaned off of meds     []  See Progress Note/Recertification   Patient will continue to benefit from skilled PT services to modify and progress therapeutic interventions, address functional mobility deficits, address ROM deficits, address strength deficits, analyze and address soft tissue restrictions, analyze and cue movement patterns, analyze and modify body mechanics/ergonomics, assess and modify postural abnormalities and instruct in home and community integration to attain remaining goals.    Progress toward goals / Updated goals:    See progress note for progress with goals      PLAN  []  Upgrade activities as tolerated YES Continue plan of care   []  Discharge due to :    []  Other:      Therapist: Jayshree Polanco PT    Date: 1/28/2021 Time: 11:49 AM     Future Appointments   Date Time Provider Rose De Leon   2/2/2021 11:00 AM Jeri Nguyen, PT [facility]   2/4/2021 11:00 AM Jeri Nguyen, PT [facility]   2/9/2021 12:45 PM Jeri Nguyen, PT [facility]   2/11/2021  2:15 PM Jeri Nguyen, PT [facility]   2/16/2021 11:00 AM Jeri Nguyen, PT [facility]   2/18/2021 11:00 AM Jeri Nguyen PT [facility]   2/23/2021 11:00 AM Jeri Nguyen, PT [facility]   2/26/2021  8:45 AM Kelley Welch, PT [facility]

## 2021-02-02 ENCOUNTER — APPOINTMENT (OUTPATIENT)
Dept: PHYSICAL THERAPY | Age: 51
End: 2021-02-02
Payer: OTHER GOVERNMENT

## 2021-02-04 ENCOUNTER — APPOINTMENT (OUTPATIENT)
Dept: PHYSICAL THERAPY | Age: 51
End: 2021-02-04
Payer: OTHER GOVERNMENT

## 2021-02-09 ENCOUNTER — HOSPITAL ENCOUNTER (OUTPATIENT)
Dept: PHYSICAL THERAPY | Age: 51
Discharge: HOME | End: 2021-02-09
Payer: OTHER GOVERNMENT

## 2021-02-09 PROCEDURE — 97110 THERAPEUTIC EXERCISES: CPT

## 2021-02-09 NOTE — PROGRESS NOTES
PHYSICAL THERAPY - DAILY TREATMENT NOTE    Patient Name: Ashleigh Bearden        Date: 2021  : 1970   YES Patient  Verified  Visit #:     Insurance: Payor: RICHARD / Plan: Ravinder Aggarwal 74 / Product Type:  /      In time: 12:50 P Out time: 1:45 P   Total Treatment Time: 50     BCBS/Medicare Time Tracking (below)   Total Timed Codes (min):  NA 1:1 Treatment Time:  NA     TREATMENT AREA = Low back pain [M54.5]    SUBJECTIVE  Pain Level (on 0 to 10 scale):  4  / 10   Medication Changes/New allergies or changes in medical history, any new surgeries or procedures? NO    If yes, update Summary List   Subjective Functional Status/Changes:  []  No changes reported     Patient reports her pain was 7-8/10 last week after driving to [place] back & forth in less than two days, she had stopped her meloxicam for 2 weeks & had to start taking it again because her R hip was so painful.            Modalities Rationale:     decrease edema, decrease inflammation and decrease pain to improve patient's ability to return to pain-free sitting   min [] Estim, type/location:                                      []  att     []  unatt     []  w/US     []  w/ice    []  w/heat    min []  Mechanical Traction: type/lbs                   []  pro   []  sup   []  int   []  cont    []  before manual    []  after manual    min []  Ultrasound, settings/location:      min []  Iontophoresis w/ dexamethasone, location:                                               []  take home patch       []  in clinic   10 min [x]  Ice     [x]  Heat    location/position: CP to R hip, MHP to l/s in supine     min []  Vasopneumatic Device, press/temp:     min []  Other:    [] Skin assessment post-treatment (if applicable):    [x]  intact    [x]  redness- no adverse reaction     []redness  adverse reaction:        40 min Therapeutic Exercise:  [x]  See flow sheet   Rationale:      increase ROM and increase strength to improve the patients ability to return to pain-free sit to stand transfers     Billed With/As:   [] TE   [] TA   [] Neuro   [] Self Care Patient Education: [x] Review HEP    [] Progressed/Changed HEP based on:   [] positioning   [] body mechanics   [] transfers   [] heat/ice application    [] other:      Other Objective/Functional Measures:    Held standing exercises given increased R hip pain today      Post Treatment Pain Level (on 0 to 10) scale:   3  / 10     ASSESSMENT  Assessment/Changes in Function:     Slow progress with symptom reduction in regards to R hip, R hip flexion continues to be moderately painful l     []  See Progress Note/Recertification   Patient will continue to benefit from skilled PT services to modify and progress therapeutic interventions, address functional mobility deficits, address ROM deficits, address strength deficits, analyze and modify body mechanics/ergonomics, assess and modify postural abnormalities, address imbalance/dizziness and instruct in home and community integration to attain remaining goals.    Progress toward goals / Updated goals:    Progressing towards LTG 1, 3      PLAN  []  Upgrade activities as tolerated YES Continue plan of care   []  Discharge due to :    []  Other:      Therapist: Cristhian Vázquez PT    Date: 2/9/2021 Time: 4:11 PM     Future Appointments   Date Time Provider Rose De Leon   2/11/2021  2:15 PM Lonza Colt PT [facility] [place] [facility]   2/16/2021 11:00 AM Lonza Colt, PT [facility]   2/18/2021 11:00 AM Lonza Colt PT [facility]   2/23/2021 11:00 AM Lonza Colt, PT [facility]   2/26/2021  8:45 AM Fiordaliza Welch PT [facility]

## 2021-02-11 ENCOUNTER — HOSPITAL ENCOUNTER (OUTPATIENT)
Dept: PHYSICAL THERAPY | Age: 51
Discharge: HOME | End: 2021-02-11
Payer: OTHER GOVERNMENT

## 2021-02-11 PROCEDURE — 97110 THERAPEUTIC EXERCISES: CPT

## 2021-02-11 PROCEDURE — 97140 MANUAL THERAPY 1/> REGIONS: CPT

## 2021-02-12 NOTE — PROGRESS NOTES
PHYSICAL THERAPY - DAILY TREATMENT NOTE    Patient Name: Rika Bobo        Date: 2021  : 1970   YES Patient  Verified  Visit #:     Insurance: Payor:  / Plan: Ravinder Aggarwal 74 / Product Type:  /      In time: 3:50 P Out time: 4:45 P   Total Treatment Time: 50     BCBS/Medicare Time Tracking (below)   Total Timed Codes (min):  NA 1:1 Treatment Time:  NA     TREATMENT AREA = Low back pain [M54.5]    SUBJECTIVE  Pain Level (on 0 to 10 scale):  4-5  / 10   Medication Changes/New allergies or changes in medical history, any new surgeries or procedures?     NO    If yes, update Summary List   Subjective Functional Status/Changes:  []  No changes reported     Patient reports that her R hip hurts when she gets up from the chair or tries to bend or kneel down to get to dog bowl            Modalities Rationale:     decrease edema, decrease inflammation and decrease pain to improve patient's ability to return to pain-free squatting   min [] Estim, type/location:                                      []  att     []  unatt     []  w/US     []  w/ice    []  w/heat    min []  Mechanical Traction: type/lbs                   []  pro   []  sup   []  int   []  cont    []  before manual    []  after manual    min []  Ultrasound, settings/location:      min []  Iontophoresis w/ dexamethasone, location:                                               []  take home patch       []  in clinic   10 min [x]  Ice     [x]  Heat    location/position: CP to R hip flexor, MHP to l/s in supine     min []  Vasopneumatic Device, press/temp:     min []  Other:    [] Skin assessment post-treatment (if applicable):    [x]  intact    [x]  redness- no adverse reaction     []redness  adverse reaction:        30 min Therapeutic Exercise:  [x]  See flow sheet   Rationale:      increase ROM and increase strength to improve the patients ability to return to pain-free lifting        10 min Manual Therapy: Inferior glides to R hip, long axis distraction in supine    Rationale:      decrease pain and increase ROM to improve patient's ability to return to pain-free sit to stand transfers   The manual therapy interventions were performed at a separate and distinct time from the therapeutic activities interventions. Billed With/As:   [] TE   [] TA   [] Neuro   [] Self Care Patient Education: [x] Review HEP    [] Progressed/Changed HEP based on:   [] positioning   [] body mechanics   [] transfers   [] heat/ice application    [] other:      Other Objective/Functional Measures:  See flow sheet     Post Treatment Pain Level (on 0 to 10) scale:   4  / 10     ASSESSMENT  Assessment/Changes in Function:     Slow progress with pain reduction in R hip     []  See Progress Note/Recertification   Patient will continue to benefit from skilled PT services to modify and progress therapeutic interventions, address functional mobility deficits, address ROM deficits, address strength deficits, analyze and address soft tissue restrictions, analyze and cue movement patterns, analyze and modify body mechanics/ergonomics, assess and modify postural abnormalities, address imbalance/dizziness and instruct in home and community integration to attain remaining goals.    Progress toward goals / Updated goals:    Progressing towards LTG 1, 2      PLAN  []  Upgrade activities as tolerated YES Continue plan of care   []  Discharge due to :    []  Other:      Therapist: Shani Hackett PT    Date: 2/11/2021 Time: 7:04 PM     Future Appointments   Date Time Provider Rose De Leon   2/16/2021 11:00 AM Romario Heard PT [facility] [place] [facility]   2/18/2021 11:00 AM Romario Heard PT [facility]   2/23/2021 11:00 AM Romario Heard PT [facility]   2/26/2021  8:45 AM Valencia Welch, PT [facility]

## 2021-02-16 ENCOUNTER — HOSPITAL ENCOUNTER (OUTPATIENT)
Dept: PHYSICAL THERAPY | Age: 51
Discharge: HOME | End: 2021-02-16
Payer: OTHER GOVERNMENT

## 2021-02-16 PROCEDURE — 97110 THERAPEUTIC EXERCISES: CPT

## 2021-02-16 PROCEDURE — 97140 MANUAL THERAPY 1/> REGIONS: CPT

## 2021-02-16 NOTE — PROGRESS NOTES
[address] [facility] PHYSICAL THERAPY AT [address] [place] [address]  [address]oni [address], [address], [address]  Phone: [phone]  Fax: [phone]  PROGRESS NOTE  Patient Name: Rico Leal : 1970   Treatment/Medical Diagnosis: Low back pain [M54.5]   Referral Source: Aide Szymanski MD     Date of Initial Visit: 12-10-20 Attended Visits: 18 Missed Visits: 0     SUMMARY OF TREATMENT  Therapeutic exercise including ROM, stretching, gentle strengthening, stabilization training, postural ed, patient education, HEP instruction, MHP, manual therapy including hip mobilizations to R hip. CURRENT STATUS  Mrs. Josiah Simmons has experienced recent worsening of R hip pain, she recently took a long road trip & her pain has steadily worsened over the last two weeks. She had discontinued use of meloxicam for at least 2 weeks prior to this incident but was recommended to resume this medication. She continues to report primary c/o R groin pain with pain provocation tests (+) on R for hip dysfunction (ALEKS, SCOUR test), c/o pain with any hip flexion activities such as sit to stand transfers after periods of prolonged sitting as well as squatting & now demonstrates signs of antalgia on R LE. She demonstrates c/o pain upon MMT of R hip flexion 4/5 as well as pain with passive overpressure to IR>ER. Pain in R hip is now constant in nature, 5/10 at worst, 4/10 average & is now disrupting her sleep. Goal/Measure of Progress Goal Met? 1. Patient to perform all bed mobility & sit to stand transfers with no increase in LBP or L LE pain   Status at last Eval: Intermittent c/o pain, decreased intensity Current Status: Limited by pain progressing   2. Patient to report 50% improvement in overall function in preparation for return to recreational activities with manageable sx in lower back. Status at last Eval: NA Current Status: No change no   3.   Improve overall strength of B hips to 4+ to 5-/5 with no c/o pain upon MMT so strength is available for return to walking program.   Status at last Eval: 4/5 with slight c/o pain upon MMT of R hip ABD, ext, flex Current Status: Continues to be limited by pain  progressing     New Goals to be achieved in __3-4__  weeks:  1. As above       RECOMMENDATIONS  Patient to be placed on hold after this week given increase in R hip pain & lack of pain reduction with treatment, f/u with MD on 2/22/21  If you have any questions/comments please contact us directly at [phone]. Thank you for allowing us to assist in the care of your patient. Therapist Signature: JOE Ugalde, cert MDT Date: 3/54/9315     Time: 11:15 AM   NOTE TO PHYSICIAN:  PLEASE COMPLETE THE ORDERS BELOW AND FAX TO   [facility]: [phone]. If you are unable to process this request in 24 hours please contact our office: [phone].    ___ I have read the above report and request that my patient continue as recommended.   ___ I have read the above report and request that my patient continue therapy with the following changes/special instructions:_________________________________________________________   ___ I have read the above report and request that my patient be discharged from therapy.      Physician Signature:        Date:       Time:

## 2021-02-16 NOTE — PROGRESS NOTES
PHYSICAL THERAPY - DAILY TREATMENT NOTE    Patient Name: Rika Bobo        Date: 2021  : 1970   YES Patient  Verified  Visit #:     Insurance: Payor:  / Plan: Ravinder Aggarwal 74 / Product Type:  /      In time: 11:05 A Out time: 12 P   Total Treatment Time: 50     BCBS/Medicare Time Tracking (below)   Total Timed Codes (min):  NA 1:1 Treatment Time:  NA     TREATMENT AREA =  Low back pain [M54.5]    SUBJECTIVE  Pain Level (on 0 to 10 scale):  5  / 10   Medication Changes/New allergies or changes in medical history, any new surgeries or procedures?     NO    If yes, update Summary List   Subjective Functional Status/Changes:  []  No changes reported     See updated progress note            Modalities Rationale:     decrease edema, decrease inflammation and decrease pain to improve patient's ability to return to pain-free standing   min [] Estim, type/location:                                      []  att     []  unatt     []  w/US     []  w/ice    []  w/heat    min []  Mechanical Traction: type/lbs                   []  pro   []  sup   []  int   []  cont    []  before manual    []  after manual    min []  Ultrasound, settings/location:      min []  Iontophoresis w/ dexamethasone, location:                                               []  take home patch       []  in clinic   10 min [x]  Ice     [x]  Heat    location/position: Supine to R hip flexor, MHP to l/s     min []  Vasopneumatic Device, press/temp:     min []  Other:    [] Skin assessment post-treatment (if applicable):    [x]  intact    [x]  redness- no adverse reaction     []redness  adverse reaction:        30 min Therapeutic Exercise:  [x]  See flow sheet   Rationale:      increase ROM and increase strength to improve the patients ability to return to pain-free standing     10 min Manual Therapy: Long axis distraction of R hip at ankle, inferior glide of R hip in supine   Rationale:      decrease pain and increase ROM to improve patient's ability to return to pain-free sit to stand transfers  The manual therapy interventions were performed at a separate and distinct time from the therapeutic activities interventions. Billed With/As:   [] TE   [] TA   [] Neuro   [] Self Care Patient Education: [x] Review HEP    [] Progressed/Changed HEP based on:   [] positioning   [] body mechanics   [] transfers   [] heat/ice application    [] other:      Other Objective/Functional Measures:    MMT R hip flexion 4/5 with c/o pain upon MMT  (+) hip ER<IR passively  (+) ALEKS on R     Post Treatment Pain Level (on 0 to 10) scale:   5  / 10     ASSESSMENT  Assessment/Changes in Function:     No change in R hip pain, patient advised to resume meloxican meds     []  See Progress Note/Recertification   Patient will continue to benefit from skilled PT services to modify and progress therapeutic interventions, address functional mobility deficits, address ROM deficits, address strength deficits, analyze and address soft tissue restrictions, analyze and cue movement patterns, analyze and modify body mechanics/ergonomics, assess and modify postural abnormalities, address imbalance/dizziness and instruct in home and community integration to attain remaining goals.    Progress toward goals / Updated goals:    See updated progress to MD for progress with goals      PLAN  []  Upgrade activities as tolerated YES Continue plan of care   []  Discharge due to :    [x]  Other: Progress note to MD      Therapist: Concetta Crigler, PT    Date: 2/16/2021 Time: 12:21 PM     Future Appointments   Date Time Provider Rose De Leon   2/18/2021 11:00 AM Kat Reinoso PT [facility]   2/23/2021 11:00 AM Kat Reinoso PT [facility]   2/26/2021  8:45 AM Naty Welch, PT [facility]

## 2021-02-18 ENCOUNTER — HOSPITAL ENCOUNTER (OUTPATIENT)
Dept: PHYSICAL THERAPY | Age: 51
Discharge: HOME | End: 2021-02-18
Payer: OTHER GOVERNMENT

## 2021-02-18 PROCEDURE — 97110 THERAPEUTIC EXERCISES: CPT

## 2021-02-18 NOTE — PROGRESS NOTES
PHYSICAL THERAPY - DAILY TREATMENT NOTE    Patient Name: Ana Vines        Date: 2021  : 1970   YES Patient  Verified  Visit #:     Insurance: Payor: RICHARD / Plan: Shaina Quan / Product Type:  /      In time: 6 A Out time: 11:55 A   Total Treatment Time: 50     BCBS/Medicare Time Tracking (below)   Total Timed Codes (min):  NA 1:1 Treatment Time:  NA     TREATMENT AREA = Low back pain [M54.5]    SUBJECTIVE  Pain Level (on 0 to 10 scale):   10   Medication Changes/New allergies or changes in medical history, any new surgeries or procedures? NO    If yes, update Summary List   Subjective Functional Status/Changes:  []  No changes reported     Patient reports that she resumed taking the meloxicam & she feels about the same, she still has pain when she walks & gets up from the chair of squats, f/u with MD on Monday.             Modalities Rationale:     decrease edema, decrease inflammation and decrease pain to improve patient's ability to return to pain-free standing    min [] Estim, type/location:                                      []  att     []  unatt     []  w/US     []  w/ice    []  w/heat    min []  Mechanical Traction: type/lbs                   []  pro   []  sup   []  int   []  cont    []  before manual    []  after manual    min []  Ultrasound, settings/location:      min []  Iontophoresis w/ dexamethasone, location:                                               []  take home patch       []  in clinic   10 min [x]  Ice     [x]  Heat    location/position: Supine to l/s MHP, CP to R hip flexor in supine    min []  Vasopneumatic Device, press/temp:     min []  Other:    [] Skin assessment post-treatment (if applicable):    [x]  intact    []  redness- no adverse reaction     []redness  adverse reaction:        40 min Therapeutic Exercise:  [x]  See flow sheet   Rationale:      increase ROM and increase strength to improve the patients ability to return to pain-free sit to stand transfers     Billed With/As:   [] TE   [] TA   [] Neuro   [] Self Care Patient Education: [x] Review HEP    [] Progressed/Changed HEP based on:   [] positioning   [] body mechanics   [] transfers   [] heat/ice application    [] other:      Other Objective/Functional Measures: Added resistance to provide slight distraction at R hip with hip flexor stretch (in kneeling), SKTC in supine & child's pose     Post Treatment Pain Level (on 0 to 10) scale:   5  / 10     ASSESSMENT  Assessment/Changes in Function:     Improved tolerance to stretches to R hip with gapping the joint using assistance band     []  See Progress Note/Recertification   Patient will continue to benefit from skilled PT services to modify and progress therapeutic interventions, address functional mobility deficits, address ROM deficits, address strength deficits, analyze and cue movement patterns, analyze and modify body mechanics/ergonomics, assess and modify postural abnormalities, address imbalance/dizziness and instruct in home and community integration to attain remaining goals.    Progress toward goals / Updated goals:    Progressing towards LTG 1, 2      PLAN  []  Upgrade activities as tolerated YES Continue plan of care   []  Discharge due to :    []  Other: Issued copy of progress note for MD, recommend to place on hold      Therapist: Mary Shell PT    Date: 2/18/2021 Time: 2:18 PM     Future Appointments   Date Time Provider Rose De Leon   2/23/2021 11:00 AM Chio Andrade, PT [facility]   2/26/2021  8:45 AM [facility], [facility]

## 2021-02-23 ENCOUNTER — HOSPITAL ENCOUNTER (OUTPATIENT)
Dept: PHYSICAL THERAPY | Age: 51
Discharge: HOME | End: 2021-02-23
Payer: OTHER GOVERNMENT

## 2021-02-23 PROCEDURE — 97110 THERAPEUTIC EXERCISES: CPT

## 2021-02-23 PROCEDURE — 97140 MANUAL THERAPY 1/> REGIONS: CPT

## 2021-02-23 NOTE — PROGRESS NOTES
PHYSICAL THERAPY - DAILY TREATMENT NOTE    Patient Name: Cristobal Mccarty        Date: 2021  : 1970   YES Patient  Verified  Visit #:     Insurance: Payor:  / Plan: Ravinder Aggarwal 74 / Product Type:  /      In time: 11 A Out time: 12:20 P   Total Treatment Time: 65     BCBS/Medicare Time Tracking (below)   Total Timed Codes (min):  NA 1:1 Treatment Time:  NA     TREATMENT AREA = Low back pain [M54.5]    SUBJECTIVE  Pain Level (on 0 to 10 scale):  3-4  / 10   Medication Changes/New allergies or changes in medical history, any new surgeries or procedures? NO    If yes, update Summary List   Subjective Functional Status/Changes:  []  No changes reported     Patient reports that she will have MRI of her R hip & a steroid injection & doctor will like to hold off of dry needling.             Modalities Rationale:     decrease edema, decrease inflammation and decrease pain to improve patient's ability to return to pain-free sit to stand transfers   min [] Estim, type/location:                                      []  att     []  unatt     []  w/US     []  w/ice    []  w/heat    min []  Mechanical Traction: type/lbs                   []  pro   []  sup   []  int   []  cont    []  before manual    []  after manual    min []  Ultrasound, settings/location:      min []  Iontophoresis w/ dexamethasone, location:                                               []  take home patch       []  in clinic   10 min [x]  Ice     []  Heat    location/position: Supine to R hip flexor     min []  Vasopneumatic Device, press/temp:     min []  Other:    [] Skin assessment post-treatment (if applicable):    [x]  intact    [x]  redness- no adverse reaction     []redness  adverse reaction:        45 min Therapeutic Exercise:  [x]  See flow sheet   Rationale:      increase ROM, increase strength, improve balance and increase proprioception to improve the patients ability to return to pain-free ambulation      10 min Manual Therapy: Inf glide to R hip, long axis distraction in supine   Rationale:      decrease pain and increase ROM to improve patient's ability to return to pain-free squatting at home  The manual therapy interventions were performed at a separate and distinct time from the therapeutic activities interventions. Billed With/As:   [] TE   [] TA   [] Neuro   [] Self Care Patient Education: [x] Review HEP    [] Progressed/Changed HEP based on:   [] positioning   [] body mechanics   [] transfers   [] heat/ice application    [] other:      Other Objective/Functional Measures:    See flow sheet   Post Treatment Pain Level (on 0 to 10) scale:   3  / 10     ASSESSMENT  Assessment/Changes in Function:     Improved tolerance to [facility] & lion stretch/neural mobilizations      []  See Progress Note/Recertification   Patient will continue to benefit from skilled PT services to modify and progress therapeutic interventions, address functional mobility deficits, address ROM deficits, address strength deficits, analyze and cue movement patterns, analyze and modify body mechanics/ergonomics, assess and modify postural abnormalities, address imbalance/dizziness and instruct in home and community integration to attain remaining goals.    Progress toward goals / Updated goals:    Progressing towards LTG 1 & 2     PLAN  []  Upgrade activities as tolerated YES Continue plan of care   []  Discharge due to :    []  Other:      Therapist: Luis Gonzales PT    Date: 2/23/2021 Time: 12:59 PM     Future Appointments   Date Time Provider Rose De Leon   2/26/2021  8:45 AM Ladena Pae, PT [facility]   3/2/2021 11:45 AM Ladena Pae, PT [facility]   3/4/2021 10:15 AM Ladena Pae, PT [facility]   3/9/2021 10:15 AM Ladena Pae, PT [facility]   3/11/2021 12:00 PM Ladena Pae, PT [facility]   3/16/2021 11:45 AM Ladena Pae, PT [facility]   3/18/2021 10:15 AM Rebekah Fabian [place] [facility] [place] [facility]   3/23/2021 11:45 AM Lia Mayo, PT [facility]   3/25/2021 11:15 AM Lia Mayo, PT [facility]   3/30/2021 11:45 AM Nancy Welch, PT [facility]

## 2021-02-24 ENCOUNTER — APPOINTMENT (OUTPATIENT)
Dept: PHYSICAL THERAPY | Age: 51
End: 2021-02-24
Payer: OTHER GOVERNMENT

## 2021-02-26 ENCOUNTER — HOSPITAL ENCOUNTER (OUTPATIENT)
Dept: PHYSICAL THERAPY | Age: 51
Discharge: HOME | End: 2021-02-26
Payer: OTHER GOVERNMENT

## 2021-02-26 PROCEDURE — 97110 THERAPEUTIC EXERCISES: CPT

## 2021-02-26 PROCEDURE — 97140 MANUAL THERAPY 1/> REGIONS: CPT

## 2021-03-02 ENCOUNTER — HOSPITAL ENCOUNTER (OUTPATIENT)
Dept: PHYSICAL THERAPY | Age: 51
Discharge: HOME | End: 2021-03-02
Payer: OTHER GOVERNMENT

## 2021-03-02 PROCEDURE — 97110 THERAPEUTIC EXERCISES: CPT

## 2021-03-02 PROCEDURE — 97140 MANUAL THERAPY 1/> REGIONS: CPT

## 2021-03-02 NOTE — PROGRESS NOTES
PHYSICAL THERAPY - DAILY TREATMENT NOTE    Patient Name: Cristobal Mccarty        Date: 3/2/2021  : 1970   YES Patient  Verified  Visit #:     Insurance: Payor: RICHARD / Plan: Lisset Grey / Product Type:  /      In time: 12 P Out time: 1:00 P   Total Treatment Time: 55     BCBS/Medicare Time Tracking (below)   Total Timed Codes (min):  NA 1:1 Treatment Time:  NA     TREATMENT AREA =  Low back pain [M54.5]    SUBJECTIVE  Pain Level (on 0 to 10 scale):  4  / 10   Medication Changes/New allergies or changes in medical history, any new surgeries or procedures?     NO    If yes, update Summary List   Subjective Functional Status/Changes:  []  No changes reported     Patient reports she will have her MRI & shot for her R hip tomorrow           Modalities Rationale:     decrease edema, decrease inflammation and decrease pain to improve patient's ability to return to pain-free walking program    min [] Estim, type/location:                                      []  att     []  unatt     []  w/US     []  w/ice    []  w/heat    min []  Mechanical Traction: type/lbs                   []  pro   []  sup   []  int   []  cont    []  before manual    []  after manual    min []  Ultrasound, settings/location:      min []  Iontophoresis w/ dexamethasone, location:                                               []  take home patch       []  in clinic   10 min [x]  Ice     []  Heat    location/position: Supine to R hip    min []  Vasopneumatic Device, press/temp:     min []  Other:    [] Skin assessment post-treatment (if applicable):    [x]  intact    [x]  redness- no adverse reaction     []redness  adverse reaction:        35 min Therapeutic Exercise:  [x]  See flow sheet   Rationale:      increase ROM and increase strength to improve the patients ability to return to pain-free standing     10 min Manual Therapy: Inf glides to R hip, long axis distraction in supine   Rationale:      decrease pain and increase ROM to improve patient's ability to return to pain-free walking program   The manual therapy interventions were performed at a separate and distinct time from the therapeutic activities interventions. Billed With/As:   [] TE   [] TA   [] Neuro   [] Self Care Patient Education: [x] Review HEP    [] Progressed/Changed HEP based on:   [] positioning   [] body mechanics   [] transfers   [] heat/ice application    [] other:      Other Objective/Functional Measures:    See flow sheet     Post Treatment Pain Level (on 0 to 10) scale:   3-4 / 10     ASSESSMENT  Assessment/Changes in Function:     No change in R hip symptoms, no progression of l/s or L LE radicular pain, difficulty with progression of treatment limited by R hip pain      []  See Progress Note/Recertification   Patient will continue to benefit from skilled PT services to modify and progress therapeutic interventions, address functional mobility deficits, address ROM deficits, address strength deficits, analyze and address soft tissue restrictions, analyze and cue movement patterns, analyze and modify body mechanics/ergonomics, assess and modify postural abnormalities and instruct in home and community integration to attain remaining goals.    Progress toward goals / Updated goals:    Progressing towards LTG 1 & 2     PLAN  []  Upgrade activities as tolerated YES Continue plan of care   []  Discharge due to :    []  Other:      Therapist: Negra Chen PT    Date: 3/2/2021 Time: 1:01 PM     Future Appointments   Date Time Provider Rose De Leon   3/9/2021 10:15 AM Melo Amina, PT [facility]   3/11/2021 12:00 PM Melo Amina, PT [facility]   3/16/2021 11:45 AM Melo Amina, PT [facility]   3/18/2021 10:15 AM Melo Amina, PT [facility]   3/23/2021 11:45 AM Melo Amina, PT [facility]   3/25/2021 11:15 AM Melo Amina, PT [facility]   3/30/2021 11:45 AM Rubens-Oswaldo, Amaya Credit, PT [facility]

## 2021-03-04 ENCOUNTER — APPOINTMENT (OUTPATIENT)
Dept: PHYSICAL THERAPY | Age: 51
End: 2021-03-04
Payer: OTHER GOVERNMENT

## 2021-03-09 ENCOUNTER — HOSPITAL ENCOUNTER (OUTPATIENT)
Dept: PHYSICAL THERAPY | Age: 51
Discharge: HOME | End: 2021-03-09
Payer: OTHER GOVERNMENT

## 2021-03-09 PROCEDURE — 97110 THERAPEUTIC EXERCISES: CPT

## 2021-03-09 NOTE — PROGRESS NOTES
PHYSICAL THERAPY - DAILY TREATMENT NOTE    Patient Name: Demetrius Romero        Date: 3/9/2021  : 1970   YES Patient  Verified  Visit #:     Insurance: Payor:  / Plan: Ravinder Aggarwal 74 / Product Type:  /      In time: 10:15 A Out time: 11:15 A   Total Treatment Time: 55     BCBS/Medicare Time Tracking (below)   Total Timed Codes (min):  NA 1:1 Treatment Time:  NA     TREATMENT AREA = Low back pain [M54.5]    SUBJECTIVE  Pain Level (on 0 to 10 scale):  1-2  / 10   Medication Changes/New allergies or changes in medical history, any new surgeries or procedures? NO    If yes, update Summary List   Subjective Functional Status/Changes:  []  No changes reported     Patient reports she had her injection to her R hip last Wednesday & she is feeling much better, some pain in lower back but most of the pain in her R hip pain is almost gone, still waiting for results of MRI.             Modalities Rationale:     decrease edema, decrease inflammation and decrease pain to improve patient's ability to return to pain-free transfers   min [] Estim, type/location:                                      []  att     []  unatt     []  w/US     []  w/ice    []  w/heat    min []  Mechanical Traction: type/lbs                   []  pro   []  sup   []  int   []  cont    []  before manual    []  after manual    min []  Ultrasound, settings/location:      min []  Iontophoresis w/ dexamethasone, location:                                               []  take home patch       []  in clinic   10 min [x]  Ice     []  Heat    location/position: Lower back & R hip in supine    min []  Vasopneumatic Device, press/temp:     min []  Other:    [] Skin assessment post-treatment (if applicable):    [x]  intact    [x]  redness- no adverse reaction     []redness - adverse reaction:        45 min Therapeutic Exercise:  [x]  See flow sheet   Rationale:      increase ROM and increase strength to improve the patients ability to perform gardening at home     Billed With/As:   [] TE   [] TA   [] Neuro   [] Self Care Patient Education: [x] Review HEP    [] Progressed/Changed HEP based on:   [] positioning   [] body mechanics   [] transfers   [] heat/ice application    [] other:      Other Objective/Functional Measures: Added quadraped abd draw & resumed l/s per flow sheet  R hip ER/flexion no longer painful passively & actively     Post Treatment Pain Level (on 0 to 10) scale:   0-1  / 10     ASSESSMENT  Assessment/Changes in Function:     Improved tolerance to today's treatment, decreased c/o R hip pain with flexion activities     []  See Progress Note/Recertification   Patient will continue to benefit from skilled PT services to modify and progress therapeutic interventions, address functional mobility deficits, address ROM deficits, address strength deficits, analyze and address soft tissue restrictions, analyze and cue movement patterns, analyze and modify body mechanics/ergonomics, assess and modify postural abnormalities, address imbalance/dizziness and instruct in home and community integration to attain remaining goals.    Progress toward goals / Updated goals:    Progressing towards LTG 1      PLAN  []  Upgrade activities as tolerated YES Continue plan of care   []  Discharge due to :    []  Other:      Therapist: Rolly Cosme PT    Date: 3/9/2021 Time: 10:33 AM     Future Appointments   Date Time Provider Rose De Leon   3/11/2021 12:00 PM Catie Rossi, PT [facility]   3/16/2021 11:45 AM Catieaiden Rossi, PT [facility]   3/18/2021 10:15 AM Catie Wibaux, PT [facility]   3/23/2021 11:45 AM Catie Wibaux, PT [facility]   3/25/2021 11:15 AM Catie Wibaux, PT [facility]   3/30/2021 11:45 AM Nelson Welch, PT [facility]

## 2021-03-11 ENCOUNTER — HOSPITAL ENCOUNTER (OUTPATIENT)
Dept: PHYSICAL THERAPY | Age: 51
Discharge: HOME | End: 2021-03-11
Payer: OTHER GOVERNMENT

## 2021-03-11 PROCEDURE — 97110 THERAPEUTIC EXERCISES: CPT

## 2021-03-11 NOTE — PROGRESS NOTES
PHYSICAL THERAPY - DAILY TREATMENT NOTE    Patient Name: Venkat Mcgowan        Date: 3/11/2021  : 1970   YES Patient  Verified  Visit #:     Insurance: Payor: RICHARD / Plan: Ravinder Aggarwal 74 / Product Type:  /      In time: 12 P Out time: 1:10 P   Total Treatment Time: 65     BCBS/Medicare Time Tracking (below)   Total Timed Codes (min):  NA 1:1 Treatment Time:  NA     TREATMENT AREA = Low back pain [M54.5]    SUBJECTIVE  Pain Level (on 0 to 10 scale):  1  / 10   Medication Changes/New allergies or changes in medical history, any new surgeries or procedures? NO    If yes, update Summary List   Subjective Functional Status/Changes:  []  No changes reported     Patient reports she printed our results of her recent MRI of R hip.         Modalities Rationale:     decrease edema, decrease inflammation and decrease pain to improve patient's ability to return to pain-free standing   min [] Estim, type/location:                                      []  att     []  unatt     []  w/US     []  w/ice    []  w/heat    min []  Mechanical Traction: type/lbs                   []  pro   []  sup   []  int   []  cont    []  before manual    []  after manual    min []  Ultrasound, settings/location:      min []  Iontophoresis w/ dexamethasone, location:                                               []  take home patch       []  in clinic   10 min [x]  Ice     []  Heat    location/position: Supine to R LE today & lower back    min []  Vasopneumatic Device, press/temp:     min []  Other:    [] Skin assessment post-treatment (if applicable):    [x]  intact    [x]  redness- no adverse reaction     []redness  adverse reaction:        55 min Therapeutic Exercise:  [x]  See flow sheet   Rationale:      increase ROM, increase strength and improve balance to improve the patients ability to return to pain-free sit to stand transfers     Billed With/As:   [] TE   [] TA   [] Neuro   [] Self Care Patient Education: [x] Review HEP    [] Progressed/Changed HEP based on:   [] positioning   [] body mechanics   [] transfers   [] heat/ice application    [] other:      Other Objective/Functional Measures: Added upright bike today & resumed swiss ball seated abdominal draw     Post Treatment Pain Level (on 0 to 10) scale:   1  / 10     ASSESSMENT  Assessment/Changes in Function:     Good tolerance to current program & resuming l/s strengthening exercises, improved tolerance to upright back versus recumbent bike with prior PT     []  See Progress Note/Recertification   Patient will continue to benefit from skilled PT services to modify and progress therapeutic interventions, address functional mobility deficits, address ROM deficits, address strength deficits, analyze and modify body mechanics/ergonomics, assess and modify postural abnormalities, address imbalance/dizziness and instruct in home and community integration to attain remaining goals.    Progress toward goals / Updated goals:    Progressing towards LTG 1, 2      PLAN  []  Upgrade activities as tolerated YES Continue plan of care   []  Discharge due to :    []  Other:      Therapist: Toshia Suhkla PT    Date: 3/11/2021 Time: 4:08 PM     Future Appointments   Date Time Provider Rose De Leon   3/16/2021 11:45 AM Dru Pro, PT [facility]   3/18/2021 10:15 AM Dru Pro, PT [facility]   3/23/2021 11:45 AM Dru Pro, PT [facility]   3/25/2021 11:15 AM Dru Pro, PT [facility]   3/30/2021 11:45 AM Ezekiel Welch, PT [facility]

## 2021-03-16 ENCOUNTER — HOSPITAL ENCOUNTER (OUTPATIENT)
Dept: PHYSICAL THERAPY | Age: 51
Discharge: HOME | End: 2021-03-16
Payer: OTHER GOVERNMENT

## 2021-03-16 PROCEDURE — 97110 THERAPEUTIC EXERCISES: CPT

## 2021-03-16 NOTE — PROGRESS NOTES
PHYSICAL THERAPY - DAILY TREATMENT NOTE    Patient Name: Cristobal Mccarty        Date: 3/16/2021  : 1970   YES Patient  Verified  Visit #:     Insurance: Payor: RICHARD / Plan: Ravinder Aggarwal 74 / Product Type:  /      In time:  Out time: 100   Total Treatment Time: 70     BCBS/Medicare Time Tracking (below)   Total Timed Codes (min):   1:1 Treatment Time:       TREATMENT AREA =  Low back pain [M54.5]    SUBJECTIVE  Pain Level (on 0 to 10 scale):  1-2   10   Medication Changes/New allergies or changes in medical history, any new surgeries or procedures? NO    If yes, update Summary List   Subjective Functional Status/Changes:  []  No changes reported     My hip is feeling much better since getting the shot 2 weeks ago.  I did yard work over the weekend and my back was sore afterwards but I kept reaching far to weed when I was on my hands and knees instead of moving closer to the weeds           Modalities Rationale:     decrease inflammation, decrease pain and increase tissue extensibility to improve patient's ability to perform ADLs   min [] Estim, type/location:                                      []  att     []  unatt     []  w/US     []  w/ice    []  w/heat    min []  Mechanical Traction: type/lbs                   []  pro   []  sup   []  int   []  cont    []  before manual    []  after manual    min []  Ultrasound, settings/location:      min []  Iontophoresis w/ dexamethasone, location:                                               []  take home patch       []  in clinic   10 min [x]  Ice     []  Heat    location/position: L/S in supine    min []  Vasopneumatic Device, press/temp:     min []  Other:    [x] Skin assessment post-treatment (if applicable):    [x]  intact    [x]  redness- no adverse reaction     []redness  adverse reaction:        60 min Therapeutic Exercise:  [x]  See flow sheet   Rationale:      increase ROM and increase strength to improve the patients ability to perform unlimted ADLs     Billed With/As:   [] TE   [] TA   [] Neuro   [] Self Care Patient Education: [x] Review HEP    [] Progressed/Changed HEP based on:   [] positioning   [] body mechanics   [] transfers   [] heat/ice application    [] other:      Other Objective/Functional Measures:    TE per FS  Added pilates SLS and SKS, bridge with march, table top march and primals (static hold and LE ext)     Post Treatment Pain Level (on 0 to 10) scale:   1-2  / 10     ASSESSMENT  Assessment/Changes in Function:     Reviewed postural mechanics and importance of maintaining lumbar neutral when gardening and reaching while in quadruped position. Fair stability noted during SB march, cueing required to correct lumbar flexion collapse and inc TA/glut med recruitment for lateral pelvic stability when marching     []  See Progress Note/Recertification   Patient will continue to benefit from skilled PT services to modify and progress therapeutic interventions, address functional mobility deficits, address ROM deficits, address strength deficits, analyze and address soft tissue restrictions, analyze and cue movement patterns, analyze and modify body mechanics/ergonomics, assess and modify postural abnormalities, address imbalance/dizziness and instruct in home and community integration to attain remaining goals.    Progress toward goals / Updated goals:    Progressing towards LTG4     PLAN  [x]  Upgrade activities as tolerated YES Continue plan of care   []  Discharge due to :    []  Other:      Therapist: Emily Bailon, PT, DPT, MTC, CMTPT    Date: 3/16/2021 Time: 1:02 PM     Future Appointments   Date Time Provider Rose De Leon   3/18/2021 10:15 AM Chio Andrade PT [facility]   3/23/2021 11:45 AM Chio Andrade PT [facility]   3/25/2021 11:15 AM Chio Andrade PT [facility]   3/30/2021 11:45 AM Oralia Welch PT [facility]

## 2021-03-18 ENCOUNTER — HOSPITAL ENCOUNTER (OUTPATIENT)
Dept: PHYSICAL THERAPY | Age: 51
Discharge: HOME | End: 2021-03-18
Payer: OTHER GOVERNMENT

## 2021-03-18 PROCEDURE — 97110 THERAPEUTIC EXERCISES: CPT

## 2021-03-18 NOTE — PROGRESS NOTES
PHYSICAL THERAPY - DAILY TREATMENT NOTE    Patient Name: Myah Madsen        Date: 3/18/2021  : 1970   YES Patient  Verified  Visit #:   32   of   30  Insurance: Payor: RICHARD / Plan: Adrianne Brown / Product Type:  /      In time: 10:15 A Out time: 11:25 A   Total Treatment Time: 70     BCBS/Medicare Time Tracking (below)   Total Timed Codes (min):  NA 1:1 Treatment Time:  NA     TREATMENT AREA = Low back pain [M54.5]    SUBJECTIVE  Pain Level (on 0 to 10 scale):  1  / 10   Medication Changes/New allergies or changes in medical history, any new surgeries or procedures?     NO    If yes, update Summary List   Subjective Functional Status/Changes:  []  No changes reported     See progress note to MD            Modalities Rationale:     decrease edema, decrease inflammation and decrease pain to improve patient's ability to return to pain-free standing   min [] Estim, type/location:                                      []  att     []  unatt     []  w/US     []  w/ice    []  w/heat    min []  Mechanical Traction: type/lbs                   []  pro   []  sup   []  int   []  cont    []  before manual    []  after manual    min []  Ultrasound, settings/location:      min []  Iontophoresis w/ dexamethasone, location:                                               []  take home patch       []  in clinic   10 min [x]  Ice     []  Heat    location/position: Supine to l/s    min []  Vasopneumatic Device, press/temp:     min []  Other:    [] Skin assessment post-treatment (if applicable):    [x]  intact    [x]  redness- no adverse reaction     []redness  adverse reaction:        60 min Therapeutic Exercise:  [x]  See flow sheet   Rationale:      increase ROM and increase strength to improve the patients ability to return to pain-free lifting     Billed With/As:   [] TE   [] TA   [] Neuro   [] Self Care Patient Education: [x] Review HEP    [] Progressed/Changed HEP based on:   [] positioning   [] body mechanics   [] transfers   [] heat/ice application    [] other:      Other Objective/Functional Measures:    SLR/prone knee flexion (+) on L  MMT R hip overall 5-/5 no c/o pain upon MMT     Post Treatment Pain Level (on 0 to 10) scale:   1  / 10     ASSESSMENT  Assessment/Changes in Function:     Good progress with reduction of R hip pain & progression of l/s      []  See Progress Note/Recertification   Patient will continue to benefit from skilled PT services to modify and progress therapeutic interventions, address functional mobility deficits, address ROM deficits, address strength deficits, analyze and address soft tissue restrictions, analyze and cue movement patterns, analyze and modify body mechanics/ergonomics, assess and modify postural abnormalities, address imbalance/dizziness and instruct in home and community integration to attain remaining goals.    Progress toward goals / Updated goals:    Progressing towards LTG 1 & LTG 2, 3 met     PLAN  []  Upgrade activities as tolerated YES Continue plan of care   []  Discharge due to :    []  Other:      Therapist: Toshia Shukla PT    Date: 3/18/2021 Time: 10:33 AM     Future Appointments   Date Time Provider Rose De Leon   3/23/2021 11:45 AM Dru Pro PT [facility] [address]   3/25/2021 11:15 AM [facility] [address]   3/30/2021 11:45 AM [facility] [address]

## 2021-03-18 NOTE — PROGRESS NOTES
[facility] PHYSICAL THERAPY AT [address] [place] [address]  Toni Dumont [facility] [address], [address], [address]  Phone: [phone]  Fax: [phone]  PROGRESS NOTE  Patient Name: Myah Madsen : 1970   Treatment/Medical Diagnosis: Low back pain [M54.5]   Referral Source: Kendall Hart MD     Date of Initial Visit: 12-10-20 Attended Visits: 26 Missed Visits: 0     SUMMARY OF TREATMENT  Therapeutic exercise including ROM, stretching, gentle strengthening, stabilization training, postural ed, patient education, HEP instruction, Colin Bio  Mrs. Cale Abad experienced good reduction in R hip pain after most recent injection on 3/03/21. Current c/o R hip pain is now intermittent in nature, previous c/o groin pain almost fully resolved, pain in R lateral hip at 1/10 at worst, typically after periods of prolonged sitting. C/o lower back pain as well as L LE pain (to level of thigh) have also slightly reduced in intensity. She recently returned to gardening (~ 1 hours in duration) with slight increase in pain levels, but improved tolerance overall. Pain provocation tests to R hip now (-), dural tension signs continue to be (+) & reproduce LBP as well as radicular pain to level of posterior thigh (SLR & prone knee flexion), although decreased sensitivity on L. Good tolerance to current treatment, please see below for other progress with PT. Goal/Measure of Progress Goal Met? 1. Patient to perform all bed mobility & sit to stand transfers with no increase in LBP or L LE pain   Status at last Eval: Limited by pain Current Status: 1/10 Partially met    2. Patient to report 50% improvement in overall function in preparation for return to recreational activities with manageable sx in lower back. Status at last Eval: NA Current Status: 90% improvement overall in R hip,   80-85% in LBP & L LE pain yes   3.   Improve overall strength of B hips to 5-/5 with no c/o pain upon MMT so strength is available for return to walking program.   Status at last Eval: 4/5 with slight c/o pain upon MMT of R hip ABD, ext, flex Current Status: 5 to 5-/5, no c/o pain upon MMT yes     New Goals to be achieved in __3-4__  weeks:  1. Patient will be able to demonstrate the appropriate body mechanics with lifting weighted box to prevent further injury for return to lifting at home/work. 2.  Patient will demonstrate (-) neural tension with SLR & prone knee flexion test on L LE to facilitate driving activities with manageable sx. 3.  Patient will be able to tolerate gardening & resume walking program 3-4 days/week without increase in pain. 4.  Patient to be independent & compliant with HEP in preparation for D/C.       RECOMMENDATIONS  Patient to continue with PT for up to 3-4 more weeks prn in order to progress towards achieving all LTGs  If you have any questions/comments please contact us directly at ([phone]. Thank you for allowing us to assist in the care of your patient. Therapist Signature: JOE Stephens, cert MDT Date: 7/88/7541     Time: 11:15 AM   NOTE TO PHYSICIAN:  PLEASE COMPLETE THE ORDERS BELOW AND FAX TO   [facility]: [phone]. If you are unable to process this request in 24 hours please contact our office: [phone].    ___ I have read the above report and request that my patient continue as recommended.   ___ I have read the above report and request that my patient continue therapy with the following changes/special instructions:_________________________________________________________   ___ I have read the above report and request that my patient be discharged from therapy.      Physician Signature:        Date:       Time:

## 2021-03-23 ENCOUNTER — APPOINTMENT (OUTPATIENT)
Dept: PHYSICAL THERAPY | Age: 51
End: 2021-03-23
Payer: OTHER GOVERNMENT

## 2021-03-25 ENCOUNTER — HOSPITAL ENCOUNTER (OUTPATIENT)
Dept: PHYSICAL THERAPY | Age: 51
Discharge: HOME | End: 2021-03-25
Payer: OTHER GOVERNMENT

## 2021-03-25 PROCEDURE — 97110 THERAPEUTIC EXERCISES: CPT

## 2021-03-25 NOTE — PROGRESS NOTES
PHYSICAL THERAPY - DAILY TREATMENT NOTE    Patient Name: Cookie Becerra        Date: 3/25/2021  : 1970   YES Patient  Verified  Visit #:   32   of   39  Insurance: Payor:  / Plan: Ravinder Aggarwal 74 / Product Type:  /      In time: 11:15 A Out time: 12:30 P   Total Treatment Time: 70     BCBS/Medicare Time Tracking (below)   Total Timed Codes (min):  60 1:1 Treatment Time:  45     TREATMENT AREA = Low back pain [M54.5]    SUBJECTIVE  Pain Level (on 0 to 10 scale):  0  / 10   Medication Changes/New allergies or changes in medical history, any new surgeries or procedures? NO    If yes, update Summary List   Subjective Functional Status/Changes:  []  No changes reported     Patient reports she feels a tiny amount of pain in her R hip today.           Modalities Rationale:     decrease edema, decrease inflammation and decrease pain to improve patient's ability to return to pain-free sit to stand transfers    min [] Estim, type/location:                                      []  att     []  unatt     []  w/US     []  w/ice    []  w/heat    min []  Mechanical Traction: type/lbs                   []  pro   []  sup   []  int   []  cont    []  before manual    []  after manual    min []  Ultrasound, settings/location:      min []  Iontophoresis w/ dexamethasone, location:                                               []  take home patch       []  in clinic   10 min [x]  Ice     []  Heat    location/position: Supine to     min []  Vasopneumatic Device, press/temp:     min []  Other:    [] Skin assessment post-treatment (if applicable):    []  intact    []  redness- no adverse reaction     []redness  adverse reaction:        70/  45 min Therapeutic Exercise:  [x]  See flow sheet   Rationale:      increase ROM, increase strength, improve balance and increase proprioception to improve the patients ability to return to pain-free standing     Billed With/As:   [] TE   [] TA   [] Neuro   [] Self Care Patient Education: [x] Review HEP    [] Progressed/Changed HEP based on:   [] positioning   [] body mechanics   [] transfers   [] heat/ice application    [] other:      Other Objective/Functional Measures:    See flow sheet      Post Treatment Pain Level (on 0 to 10) scale:   0  / 10     ASSESSMENT  Assessment/Changes in Function:     Improved posture/neutral spine in quadraped      []  See Progress Note/Recertification   Patient will continue to benefit from skilled PT services to modify and progress therapeutic interventions, address functional mobility deficits, address ROM deficits, address strength deficits, analyze and address soft tissue restrictions, analyze and cue movement patterns, analyze and modify body mechanics/ergonomics, assess and modify postural abnormalities and instruct in home and community integration to attain remaining goals.    Progress toward goals / Updated goals:    Progressing towards newly established LTGs      PLAN  []  Upgrade activities as tolerated YES Continue plan of care   []  Discharge due to :    []  Other:      Therapist: Jessica Rose PT    Date: 3/25/2021 Time: 11:26 AM     Future Appointments   Date Time Provider Rose De Leon   3/30/2021 11:45 AM Daija Welch, PT [facility]

## 2021-03-30 ENCOUNTER — APPOINTMENT (OUTPATIENT)
Dept: PHYSICAL THERAPY | Age: 51
End: 2021-03-30
Payer: OTHER GOVERNMENT

## 2021-04-01 ENCOUNTER — HOSPITAL ENCOUNTER (OUTPATIENT)
Dept: PHYSICAL THERAPY | Age: 51
Discharge: HOME | End: 2021-04-01
Payer: OTHER GOVERNMENT

## 2021-04-01 PROCEDURE — 97110 THERAPEUTIC EXERCISES: CPT

## 2021-04-01 NOTE — PROGRESS NOTES
PHYSICAL THERAPY - DAILY TREATMENT NOTE    Patient Name: Tatiana Hobbs        Date: 2021  : 1970   YES Patient  Verified  Visit #:   32   of   39  Insurance: Payor:  / Plan: Ravinder Aggarwal 74 / Product Type:  /      In time: 12:30 P Out time: 1:35 P   Total Treatment Time: 50     BCBS/Medicare Time Tracking (below)   Total Timed Codes (min):  NA 1:1 Treatment Time:  NA     TREATMENT AREA = Low back pain [M54.5]    SUBJECTIVE  Pain Level (on 0 to 10 scale):  1  / 10   Medication Changes/New allergies or changes in medical history, any new surgeries or procedures? YES    If yes, update Summary List   Subjective Functional Status/Changes:  []  No changes reported     Patient reports she went to the ER on  3/26/21, she believes she is allergic to the antibiotic she is on, she will see an allergist but she was told to hold off on all meds, she is not taking the meloxican either, she will be going on a road trip this week.            Modalities Rationale:     decrease edema, decrease inflammation and decrease pain to improve patient's ability to return to pain-free ambulation    min [] Estim, type/location:                                      []  att     []  unatt     []  w/US     []  w/ice    []  w/heat    min []  Mechanical Traction: type/lbs                   []  pro   []  sup   []  int   []  cont    []  before manual    []  after manual    min []  Ultrasound, settings/location:      min []  Iontophoresis w/ dexamethasone, location:                                               []  take home patch       []  in clinic   10 min [x]  Ice     []  Heat    location/position: Supine to R hip flexor     min []  Vasopneumatic Device, press/temp:     min []  Other:    [] Skin assessment post-treatment (if applicable):    [x]  intact    [x]  redness- no adverse reaction     []redness  adverse reaction:        40 min Therapeutic Exercise:  [x]  See flow sheet   Rationale:      increase ROM and increase strength to improve the patients ability to return to pain-free sitting while driving     Billed With/As:   [] TE   [] TA   [] Neuro   [] Self Care Patient Education: [x] Review HEP    [] Progressed/Changed HEP based on:   [] positioning   [] body mechanics   [] transfers   [] heat/ice application    [] other:      Other Objective/Functional Measures:    Resumed banded S/L clam today     Post Treatment Pain Level (on 0 to 10) scale:   0  / 10     ASSESSMENT  Assessment/Changes in Function:     No increase in L radicular pain or back pain, no change in R hip pain with today's treatment      []  See Progress Note/Recertification   Patient will continue to benefit from skilled PT services to modify and progress therapeutic interventions, address functional mobility deficits, address ROM deficits, address strength deficits, analyze and modify body mechanics/ergonomics, assess and modify postural abnormalities, address imbalance/dizziness and instruct in home and community integration to attain remaining goals.    Progress toward goals / Updated goals:    Progressing towards LTG 1      PLAN  []  Upgrade activities as tolerated YES Continue plan of care   []  Discharge due to :    []  Other:      Therapist: Shailesh Boyer PT    Date: 4/1/2021 Time: 12:46 PM     Future Appointments   Date Time Provider Rose De Leon   4/6/2021 10:15 AM Lexi Harman, PT [facility]   4/8/2021  1:30 PM Kathia Desai, PT [facility]   4/13/2021  9:30 AM Clayborne Dienes, PT [facility]   4/15/2021 10:15 AM Clayborne Dienes, PT [facility]   4/20/2021 11:45 AM Clayborne Dienes, PT [facility]   4/22/2021 12:00 PM Robinborne Ghazal, PT [facility]   4/27/2021 11:45 AM Clayborne Dienes, PT [facility]   4/29/2021 11:45 AM Rodrigo Welch, PT [facility]

## 2021-04-06 ENCOUNTER — HOSPITAL ENCOUNTER (OUTPATIENT)
Dept: PHYSICAL THERAPY | Age: 51
Discharge: HOME | End: 2021-04-06
Payer: OTHER GOVERNMENT

## 2021-04-06 PROCEDURE — 97110 THERAPEUTIC EXERCISES: CPT

## 2021-04-06 NOTE — PROGRESS NOTES
PHYSICAL THERAPY - DAILY TREATMENT NOTE    Patient Name: Jhony Kruse        Date: 2021  : 1970   YES Patient  Verified  Visit #:   29   of   39  Insurance: Payor: RICHARD / Plan: Ravinder Aggarwal 74 / Product Type:  /      In time: 10:15 A Out time: 11:15 A   Total Treatment Time: 55     BCBS/Medicare Time Tracking (below)   Total Timed Codes (min):  NA 1:1 Treatment Time:  NA     TREATMENT AREA = Low back pain [M54.5]    SUBJECTIVE  Pain Level (on 0 to 10 scale):  1  / 10   Medication Changes/New allergies or changes in medical history, any new surgeries or procedures? NO    If yes, update Summary List   Subjective Functional Status/Changes:  []  No changes reported     Patient reports she went on her trip to [place], her R hip is hurting a little, but not a bad as last time. Some soreness from walking a lot & walking her dog that pulls when she was out of town. The pain in her lower back & down L leg seems to be slowly subsiding.           Modalities Rationale:     decrease edema, decrease inflammation and decrease pain to improve patient's ability to return to pain-free standing   min [] Estim, type/location:                                      []  att     []  unatt     []  w/US     []  w/ice    []  w/heat    min []  Mechanical Traction: type/lbs                   []  pro   []  sup   []  int   []  cont    []  before manual    []  after manual    min []  Ultrasound, settings/location:      min []  Iontophoresis w/ dexamethasone, location:                                               []  take home patch       []  in clinic   10 min [x]  Ice     []  Heat    location/position: Supine to R hip    min []  Vasopneumatic Device, press/temp:     min []  Other:    [] Skin assessment post-treatment (if applicable):    [x]  intact    [x]  redness- no adverse reaction     []redness  adverse reaction:        45 min Therapeutic Exercise:  [x]  See flow sheet   Rationale:      increase ROM, increase strength, improve balance and increase proprioception to improve the patients ability to return to pain-free walking program     Billed With/As:   [] TE   [] TA   [] Neuro   [] Self Care Patient Education: [x] Review HEP    [] Progressed/Changed HEP based on:   [] positioning   [] body mechanics   [] transfers   [] heat/ice application    [] other:      Other Objective/Functional Measures: Added standing hip ABD (on green disc), resumed hip strengthening exercises for R hip     Post Treatment Pain Level (on 0 to 10) scale:   0  / 10     ASSESSMENT  Assessment/Changes in Function:     V/c for appropriate hip hinge with mini squats & monster walk     []  See Progress Note/Recertification   Patient will continue to benefit from skilled PT services to modify and progress therapeutic interventions, address functional mobility deficits, address ROM deficits, address strength deficits, analyze and address soft tissue restrictions, analyze and cue movement patterns and analyze and modify body mechanics/ergonomics to attain remaining goals.    Progress toward goals / Updated goals:    Progressing towards LTG 1 & 2     PLAN  []  Upgrade activities as tolerated YES Continue plan of care   []  Discharge due to :    []  Other:      Therapist: Brayan Rivera PT    Date: 4/6/2021 Time: 12:10 PM     Future Appointments   Date Time Provider Rose De Leon   4/8/2021  1:30 PM Bjorn Snyder PT [facility]   4/13/2021  9:30 AM Gilmar Eduardo PT [facility]   4/15/2021 10:15 AM Gilmar Eduardo PT [facility]   4/20/2021 11:45 AM Gilmar Eduardo PT [facility]   4/22/2021 12:00 PM Gilmar Eduardo PT [facility]   4/27/2021 11:45 AM Gilmar Eduardo PT [facility]   4/29/2021 11:45 AM Rebekah, Dearmartha Tobar, PT [facility]

## 2021-04-08 ENCOUNTER — HOSPITAL ENCOUNTER (OUTPATIENT)
Dept: PHYSICAL THERAPY | Age: 51
Discharge: HOME | End: 2021-04-08
Payer: OTHER GOVERNMENT

## 2021-04-08 PROCEDURE — 97110 THERAPEUTIC EXERCISES: CPT

## 2021-04-08 NOTE — PROGRESS NOTES
PHYSICAL THERAPY - DAILY TREATMENT NOTE    Patient Name: Latia De La Cruz        Date: 2021  : 1970   YES Patient  Verified  Visit #:   27   of   39  Insurance: Payor: RICHARD / Plan: Bandar Ch / Product Type:  /      In time: 125 Out time: 230   Total Treatment Time: 60     BCBS/Medicare Time Tracking (below)   Total Timed Codes (min):   1:1 Treatment Time:       TREATMENT AREA =  Low back pain [M54.5]    SUBJECTIVE  Pain Level (on 0 to 10 scale):  2  / 10   Medication Changes/New allergies or changes in medical history, any new surgeries or procedures?     NO    If yes, update Summary List   Subjective Functional Status/Changes:  []  No changes reported     Corrine been getting occasional sharp pains in my hip but the pain doesn't last        Modalities Rationale:     decrease inflammation, decrease pain and increase tissue extensibility to improve patient's ability to perform ADLs   min [] Estim, type/location:                                      []  att     []  unatt     []  w/US     []  w/ice    []  w/heat    min []  Mechanical Traction: type/lbs                   []  pro   []  sup   []  int   []  cont    []  before manual    []  after manual    min []  Ultrasound, settings/location:      min []  Iontophoresis w/ dexamethasone, location:                                               []  take home patch       []  in clinic   10 min [x]  Ice     []  Heat    location/position: R hip in supine    min []  Vasopneumatic Device, press/temp:     min []  Other:    [x] Skin assessment post-treatment (if applicable):    [x]  intact    [x]  redness- no adverse reaction     []redness  adverse reaction:        50 min Therapeutic Exercise:  [x]  See flow sheet   Rationale:      increase ROM and increase strength to improve the patients ability to perform unlimted ADLs     Billed With/As:   [] TE   [] TA   [] Neuro   [] Self Care Patient Education: [x] Review HEP    [] Progressed/Changed HEP based on:   [] positioning   [] body mechanics   [] transfers   [] heat/ice application    [] other:      Other Objective/Functional Measures:    TE per FS  Added fire hydrants, 1/2 side plank with clam, hip 3way and hip abd with bridging      Post Treatment Pain Level (on 0 to 10) scale:   0-1  / 10     ASSESSMENT  Assessment/Changes in Function:     Good tolerance to progression of stability exercises with focus on inc glut med recruitment     []  See Progress Note/Recertification   Patient will continue to benefit from skilled PT services to modify and progress therapeutic interventions, address functional mobility deficits, address ROM deficits, address strength deficits, analyze and address soft tissue restrictions, analyze and cue movement patterns, analyze and modify body mechanics/ergonomics, assess and modify postural abnormalities, address imbalance/dizziness and instruct in home and community integration to attain remaining goals.    Progress toward goals / Updated goals:    Progressing towards LTG4     PLAN  [x]  Upgrade activities as tolerated YES Continue plan of care   []  Discharge due to :    []  Other:      Therapist: Heber Donovan PT, DPT, MTC, CMTPT    Date: 4/8/2021 Time: 1:02 PM     Future Appointments   Date Time Provider Rose De Leon   4/13/2021  9:30 AM Elana Membreno, PT [facility]   4/15/2021 10:15 AM Elana Membreno, PT [facility]   4/20/2021 11:45 AM Elana Membreno, PT [facility]   4/22/2021 12:00 PM Elana Membreno, PT [facility]   4/27/2021 11:45 AM Elana Membreno, PT [facility]   4/29/2021 11:45 AM Stephen Welch, PT [facility]

## 2021-04-13 ENCOUNTER — HOSPITAL ENCOUNTER (OUTPATIENT)
Dept: PHYSICAL THERAPY | Age: 51
Discharge: HOME | End: 2021-04-13
Payer: OTHER GOVERNMENT

## 2021-04-13 PROCEDURE — 97110 THERAPEUTIC EXERCISES: CPT

## 2021-04-13 NOTE — PROGRESS NOTES
PHYSICAL THERAPY - DAILY TREATMENT NOTE    Patient Name: Darvin Omalley        Date: 2021  : 1970   YES Patient  Verified  Visit #:   32   of   39  Insurance: Payor: RICHARD / Plan: Ravinder Aggarwal 74 / Product Type:  /      In time: 9:30 A Out time: 10:30 A   Total Treatment Time: 55     BCBS/Medicare Time Tracking (below)   Total Timed Codes (min):  NA 1:1 Treatment Time:  NA     TREATMENT AREA = Low back pain [M54.5]    SUBJECTIVE  Pain Level (on 0 to 10 scale):  2  / 10   Medication Changes/New allergies or changes in medical history, any new surgeries or procedures? NO    If yes, update Summary List   Subjective Functional Status/Changes:  []  No changes reported     Patient reports she had some pain in her R hip after last PT, she had to take motrin for 2 days.              Modalities Rationale:     decrease edema, decrease inflammation and decrease pain to improve patient's ability to return to pain-free squatting   min [] Estim, type/location:                                      []  att     []  unatt     []  w/US     []  w/ice    []  w/heat    min []  Mechanical Traction: type/lbs                   []  pro   []  sup   []  int   []  cont    []  before manual    []  after manual    min []  Ultrasound, settings/location:      min []  Iontophoresis w/ dexamethasone, location:                                               []  take home patch       []  in clinic   10 min [x]  Ice     []  Heat    location/position: Supine to R hip     min []  Vasopneumatic Device, press/temp:     min []  Other:    [] Skin assessment post-treatment (if applicable):    []  intact    []  redness- no adverse reaction     []redness  adverse reaction:        45 min Therapeutic Exercise:  [x]  See flow sheet   Rationale:      increase ROM, increase strength and improve balance to improve the patients ability to return to pain-free transfers     Billed With/As:   [] TE   [] TA   [] Neuro   [] Self Care Patient Education: [x] Review HEP    [] Progressed/Changed HEP based on:   [] positioning   [] body mechanics   [] transfers   [] heat/ice application    [] other:      Other Objective/Functional Measures:    Modified exercises per flow sheet     Post Treatment Pain Level (on 0 to 10) scale:   1  / 10     ASSESSMENT  Assessment/Changes in Function:     Good tolerance to today's treatment with modifications no increase in R hip pain with PT, v/c for appropriate posture with standing movement prep exercises with mini band     []  See Progress Note/Recertification   Patient will continue to benefit from skilled PT services to modify and progress therapeutic interventions, address functional mobility deficits, address ROM deficits, address strength deficits, analyze and cue movement patterns, analyze and modify body mechanics/ergonomics, assess and modify postural abnormalities and instruct in home and community integration to attain remaining goals.    Progress toward goals / Updated goals:    Progressing towards LTG 1 & 2     PLAN  []  Upgrade activities as tolerated YES Continue plan of care   []  Discharge due to :    []  Other:      Therapist: Balbina Rodriguez PT    Date: 4/13/2021 Time: 3:18 PM     Future Appointments   Date Time Provider Rose De Leon   4/15/2021 10:15 AM Nichol Sanderson, PT [facility]   4/20/2021 11:45 AM Nichol Sanderson, PT [facility]   4/22/2021 12:00 PM Nichol Sanderson, PT [facility]   4/27/2021 11:45 AM Nichol Sanderson PT [facility]   4/29/2021 11:45 AM Ky Welch, PT [facility]

## 2021-04-15 ENCOUNTER — HOSPITAL ENCOUNTER (OUTPATIENT)
Dept: PHYSICAL THERAPY | Age: 51
Discharge: HOME | End: 2021-04-15
Payer: OTHER GOVERNMENT

## 2021-04-15 PROCEDURE — 97110 THERAPEUTIC EXERCISES: CPT

## 2021-04-15 NOTE — PROGRESS NOTES
PHYSICAL THERAPY - DAILY TREATMENT NOTE    Patient Name: Latia De La Cruz        Date: 4/15/2021  : 1970   YES Patient  Verified  Visit #:   28   of   39  Insurance: Payor:  / Plan: Ravinder Aggarwal 74 / Product Type:  /      In time: 10:15 A Out time: 11:30 A   Total Treatment Time: 70/45     BCBS/Medicare Time Tracking (below)   Total Timed Codes (min):  NA 1:1 Treatment Time:  NA     TREATMENT AREA = Low back pain [M54.5]    SUBJECTIVE  Pain Level (on 0 to 10 scale):  0-1   10 in lower back & R hip   Medication Changes/New allergies or changes in medical history, any new surgeries or procedures?     NO    If yes, update Summary List   Subjective Functional Status/Changes:  []  No changes reported     Patient reports she had pain in her R hip up to 3-4/10           Modalities Rationale:     decrease edema, decrease inflammation and decrease pain to improve patient's ability to return to pain-free ambulation    min [] Estim, type/location:                                      []  att     []  unatt     []  w/US     []  w/ice    []  w/heat    min []  Mechanical Traction: type/lbs                   []  pro   []  sup   []  int   []  cont    []  before manual    []  after manual    min []  Ultrasound, settings/location:      min []  Iontophoresis w/ dexamethasone, location:                                               []  take home patch       []  in clinic   10 min [x]  Ice     []  Heat    location/position: Supine to l/s     min []  Vasopneumatic Device, press/temp:     min []  Other:    [] Skin assessment post-treatment (if applicable):    [x]  intact    [x]  redness- no adverse reaction     []redness  adverse reaction:        60  45 min Therapeutic Exercise:  [x]  See flow sheet   Rationale:      increase ROM and increase strength to improve the patients ability to return to pain-free sit to stand transfers      Billed With/As:   [] TE   [] TA   [] Neuro   [] Self Care Patient Education: [x] Review HEP    [] Progressed/Changed HEP based on:   [] positioning   [] body mechanics   [] transfers   [] heat/ice application    [] other:      Other Objective/Functional Measures: Added modified plank (high plank position), resumed standing theraband and mini band exercises today      Post Treatment Pain Level (on 0 to 10) scale:   1  / 10     ASSESSMENT  Assessment/Changes in Function:     Slight increase in full plank in lower back, improved tolerance to modified in hands & knees position      []  See Progress Note/Recertification   Patient will continue to benefit from skilled PT services to modify and progress therapeutic interventions, address functional mobility deficits, address ROM deficits, address strength deficits, analyze and address soft tissue restrictions, analyze and cue movement patterns, analyze and modify body mechanics/ergonomics, assess and modify postural abnormalities and instruct in home and community integration to attain remaining goals.    Progress toward goals / Updated goals:    Progressing towards LTG 3      PLAN  []  Upgrade activities as tolerated YES Continue plan of care   []  Discharge due to :    []  Other:      Therapist: Jacqui Garg PT    Date: 4/15/2021 Time: 2:20 PM     Future Appointments   Date Time Provider Rose De Leon   4/20/2021 11:45 AM Esau Vergara, PT [facility]   4/22/2021 12:00 PM Esau Vergara, PT [facility]   4/27/2021 11:45 AM Esau Vergara, PT [facility]   4/29/2021 11:45 AM Marcial Welch, PT [facility]

## 2021-04-20 ENCOUNTER — HOSPITAL ENCOUNTER (OUTPATIENT)
Dept: PHYSICAL THERAPY | Age: 51
Discharge: HOME | End: 2021-04-20
Payer: OTHER GOVERNMENT

## 2021-04-20 PROCEDURE — 97110 THERAPEUTIC EXERCISES: CPT

## 2021-04-20 NOTE — PROGRESS NOTES
PHYSICAL THERAPY - DAILY TREATMENT NOTE    Patient Name: Lois Rivera        Date: 2021  : 1970   YES Patient  Verified  Visit #:   35   of   39  Insurance: Payor:  / Plan: Ravinder Aggarwal 73 / Product Type:  /      In time: 11:45 A Out time: 12:35 P   Total Treatment Time: 50     BCBS/Medicare Time Tracking (below)   Total Timed Codes (min):  NA 1:1 Treatment Time:  NA     TREATMENT AREA = Low back pain [M54.5]    SUBJECTIVE  Pain Level (on 0 to 10 scale):  2  / 10   Medication Changes/New allergies or changes in medical history, any new surgeries or procedures? NO    If yes, update Summary List   Subjective Functional Status/Changes:  []  No changes reported     Patient reports that her R hip has been bothering her more. She will decide by the end of the week if she will f/u with her MD and possibly get a second shot in her R hip. Her back has been doing well, she feels better after PT but the pain comes back.             Modalities Rationale:     decrease edema, decrease inflammation and decrease pain to improve patient's ability to return to pain-free sit stand transfers   min [] Estim, type/location:                                      []  att     []  unatt     []  w/US     []  w/ice    []  w/heat    min []  Mechanical Traction: type/lbs                   []  pro   []  sup   []  int   []  cont    []  before manual    []  after manual    min []  Ultrasound, settings/location:      min []  Iontophoresis w/ dexamethasone, location:                                               []  take home patch       []  in clinic   10 min [x]  Ice     []  Heat    location/position: Supine to R hip    min []  Vasopneumatic Device, press/temp:     min []  Other:    [] Skin assessment post-treatment (if applicable):    [x]  intact    [x]  redness- no adverse reaction     []redness - adverse reaction:        40 min Therapeutic Exercise:  [x]  See flow sheet   Rationale:      increase ROM to improve the patients ability to return to pain-free squatting for gardening     Billed With/As:   [] TE   [] TA   [] Neuro   [] Self Care Patient Education: [x] Review HEP    [] Progressed/Changed HEP based on:   [] positioning   [] body mechanics   [] transfers   [] heat/ice application    [] other:      Other Objective/Functional Measures:    See flow sheet     Post Treatment Pain Level (on 0 to 10) scale:   0  / 10     ASSESSMENT  Assessment/Changes in Function:     Improved tolerance to modified plank series (in both high plank & side plank) c/o pain with squatting as well as monster walk with mini bands     []  See Progress Note/Recertification   Patient will continue to benefit from skilled PT services to modify and progress therapeutic interventions, address functional mobility deficits, address ROM deficits, address strength deficits, analyze and address soft tissue restrictions, analyze and cue movement patterns, analyze and modify body mechanics/ergonomics, assess and modify postural abnormalities, address imbalance/dizziness and instruct in home and community integration to attain remaining goals.    Progress toward goals / Updated goals:    Progressing towards LTG 1      PLAN  []  Upgrade activities as tolerated YES Continue plan of care   []  Discharge due to :    []  Other:      Therapist: Manuela Franz, PT    Date: 4/20/2021 Time: 2:04 PM     Future Appointments   Date Time Provider Rose De Leon   4/22/2021 12:00 PM Sofia Rosen, PT [facility]   4/27/2021 11:45 AM Jose Ramon Fernando, PT [facility]   4/29/2021 11:45 AM Odalys Welch, PT [facility]

## 2021-04-22 ENCOUNTER — APPOINTMENT (OUTPATIENT)
Dept: PHYSICAL THERAPY | Age: 51
End: 2021-04-22
Payer: OTHER GOVERNMENT

## 2021-04-27 ENCOUNTER — APPOINTMENT (OUTPATIENT)
Dept: PHYSICAL THERAPY | Age: 51
End: 2021-04-27
Payer: OTHER GOVERNMENT

## 2021-04-29 ENCOUNTER — APPOINTMENT (OUTPATIENT)
Dept: PHYSICAL THERAPY | Age: 51
End: 2021-04-29
Payer: OTHER GOVERNMENT

## 2021-05-14 NOTE — PROGRESS NOTES
[facility] PHYSICAL THERAPY AT [address] [place] [address]  Toni [address]ont [address], [address] [place] [phone], [address]  Phone: [phone]  Fax: [phone] SUMMARY  Patient Name: Collin Younger : 1970   Treatment/Medical Diagnosis: Low back pain [M54.5]   Referral Source: Ralf Sommer MD     Date of Initial Visit: 12/10/20 Attended Visits: 33 Missed Visits: 0     SUMMARY OF TREATMENT  Therapeutic exercise including ROM, stretching, gentle strengthening, stabilization training, postural ed, patient education, HEP instruction, CP. CURRENT STATUS  Mrs. Nixon Jean was last seen for PT on 21, she was unable to be formally re-assessed prior to DC, she has reported decreased c/o pain in lower back as well as R hip. Patient discharged to home program given lack of insurance authorization for continued PT. Patient was instructed to continue with HEP & f/u with MD prn. Goal/Measure of Progress Goal Met? 1. Patient will be able to demonstrate the appropriate body mechanics with lifting weighted box to prevent further injury for return to lifting at home/work. Status at last Eval: NA Current Status: Unable to be re-assessed n/a   2. Patient will demonstrate (-) neural tension with SLR & prone knee flexion test on L LE to facilitate driving activities with manageable sx. Status at last Eval: (+) Current Status: Unable to be re-assessed n/a   3. Patient will be able to tolerate gardening & resume walking program 3-4 days/week without increase in pain. Status at last Eval: unable Current Status: Pt performing some gardening & walking her dog with manageable sx yes   4. Patient to be independent & compliant with HEP in preparation for D/C. Status at last Eval: HEP established Current Status: Good compliance with HEP  yes     RECOMMENDATIONS  Discontinue therapy. Progressing towards or have reached established goals.     If you have any questions/comments please contact us directly at [phone]. Thank you for allowing us to assist in the care of your patient.     Therapist Signature: JOE Velázquez, cert MDT Date: 9-01-87     Time: 1:08 PM

## 2024-09-26 NOTE — PROGRESS NOTES
PHYSICAL THERAPY - DAILY TREATMENT NOTE    Patient Name: Ralph Bernstein        Date: 2021  : 1970   YES Patient  Verified  Visit #:     Insurance: Payor: RICHARD / Plan: Rebecca Severino / Product Type:  /      In time: 8:50 A Out time: 10 A   Total Treatment Time: 60     BCBS/Medicare Time Tracking (below)   Total Timed Codes (min):  NA 1:1 Treatment Time:  NA     TREATMENT AREA = Low back pain [M54.5]    SUBJECTIVE  Pain Level (on 0 to 10 scale):  3-4  / 10   Medication Changes/New allergies or changes in medical history, any new surgeries or procedures? NO    If yes, update Summary List   Subjective Functional Status/Changes:  []  No changes reported     Patient reports she will have both MRI & injection to R hip next Wednesday, she is using wedge to sleep at night & it is a little helpful.             Modalities Rationale:     decrease edema, decrease inflammation and decrease pain to improve patient's ability to return to pain-free ADLs   min [] Estim, type/location:                                      []  att     []  unatt     []  w/US     []  w/ice    []  w/heat    min []  Mechanical Traction: type/lbs                   []  pro   []  sup   []  int   []  cont    []  before manual    []  after manual    min []  Ultrasound, settings/location:      min []  Iontophoresis w/ dexamethasone, location:                                               []  take home patch       []  in clinic   10 min [x]  Ice     []  Heat    location/position: Supine to R hip flexor    min []  Vasopneumatic Device, press/temp:     min []  Other:    [] Skin assessment post-treatment (if applicable):    [x]  intact    [x]  redness- no adverse reaction     []redness  adverse reaction:        40 min Therapeutic Exercise:  [x]  See flow sheet   Rationale:      increase ROM and increase strength to improve the patients ability to return to pain-free sit to stand transfers     10 min Manual Therapy: Inf mobs to R hip, long axis distraction in supine    Rationale:      decrease pain and increase ROM to improve patient's ability to return to pain-free standing  The manual therapy interventions were performed at a separate and distinct time from the therapeutic activities interventions. Billed With/As:   [] TE   [] TA   [] Neuro   [] Self Care Patient Education: [x] Review HEP    [] Progressed/Changed HEP based on:   [] positioning   [] body mechanics   [] transfers   [] heat/ice application    [] other:      Other Objective/Functional Measures:    See flow sheet     Post Treatment Pain Level (on 0 to 10) scale:   3-4  / 10     ASSESSMENT  Assessment/Changes in Function:     No change in R hip pain reduction      []  See Progress Note/Recertification   Patient will continue to benefit from skilled PT services to modify and progress therapeutic interventions, address functional mobility deficits, address ROM deficits, address strength deficits, assess and modify postural abnormalities, address imbalance/dizziness and instruct in home and community integration to attain remaining goals.    Progress toward goals / Updated goals:    Progressing towards LTG 1      PLAN  []  Upgrade activities as tolerated YES Continue plan of care   []  Discharge due to :    []  Other:      Therapist: Cristhian Vázquez PT    Date: 2/26/2021 Time: 10:49 AM     Future Appointments   Date Time Provider Rose De Leon   3/2/2021 11:45 AM Lonza Colt, PT [facility]   3/9/2021 10:15 AM Lonza Colt, PT [facility]   3/11/2021 12:00 PM Lonza Colt, PT [facility]   3/16/2021 11:45 AM Lonza Colt, PT [facility]   3/18/2021 10:15 AM Lonza Colt, PT [facility]   3/23/2021 11:45 AM Lonza Colt, PT [facility]   3/25/2021 11:15 AM Lonza Colt, PT [facility]   3/30/2021 11:45 AM Rubens-Oswaldo, Fiordaliza Leisure, PT [facility] regular rate and rhythm/S1 S2 present/no gallops/no rub/no murmur/pedal edema details…